# Patient Record
Sex: FEMALE | Race: WHITE | HISPANIC OR LATINO
[De-identification: names, ages, dates, MRNs, and addresses within clinical notes are randomized per-mention and may not be internally consistent; named-entity substitution may affect disease eponyms.]

---

## 2022-03-03 ENCOUNTER — NON-APPOINTMENT (OUTPATIENT)
Age: 72
End: 2022-03-03

## 2022-03-03 ENCOUNTER — LABORATORY RESULT (OUTPATIENT)
Age: 72
End: 2022-03-03

## 2022-03-03 ENCOUNTER — APPOINTMENT (OUTPATIENT)
Dept: INTERNAL MEDICINE | Facility: CLINIC | Age: 72
End: 2022-03-03
Payer: MEDICARE

## 2022-03-03 VITALS
SYSTOLIC BLOOD PRESSURE: 100 MMHG | HEIGHT: 61 IN | WEIGHT: 120 LBS | TEMPERATURE: 97.8 F | HEART RATE: 74 BPM | BODY MASS INDEX: 22.66 KG/M2 | OXYGEN SATURATION: 97 % | RESPIRATION RATE: 16 BRPM | DIASTOLIC BLOOD PRESSURE: 76 MMHG

## 2022-03-03 DIAGNOSIS — Z78.9 OTHER SPECIFIED HEALTH STATUS: ICD-10-CM

## 2022-03-03 DIAGNOSIS — Z80.3 FAMILY HISTORY OF MALIGNANT NEOPLASM OF BREAST: ICD-10-CM

## 2022-03-03 DIAGNOSIS — Z82.49 FAMILY HISTORY OF ISCHEMIC HEART DISEASE AND OTHER DISEASES OF THE CIRCULATORY SYSTEM: ICD-10-CM

## 2022-03-03 DIAGNOSIS — I89.0 LYMPHEDEMA, NOT ELSEWHERE CLASSIFIED: ICD-10-CM

## 2022-03-03 DIAGNOSIS — Z82.0 FAMILY HISTORY OF EPILEPSY AND OTHER DISEASES OF THE NERVOUS SYSTEM: ICD-10-CM

## 2022-03-03 PROCEDURE — 93000 ELECTROCARDIOGRAM COMPLETE: CPT

## 2022-03-03 PROCEDURE — 99387 INIT PM E/M NEW PAT 65+ YRS: CPT | Mod: 25

## 2022-03-03 NOTE — HEALTH RISK ASSESSMENT
[Excellent] : ~his/her~ current health as excellent [Good] : ~his/her~  mood as  good [Never] : Never [Yes] : Yes [de-identified] : social [de-identified] : Walks for exercise

## 2022-03-03 NOTE — HISTORY OF PRESENT ILLNESS
[FreeTextEntry1] : Here for CPE\par Overall feeling well\par Pt is fully vaccinated against COVID\par Pt had COVID infection in December 2021 [de-identified] : Never a smoker. Social alcohol.\par Last mammogram October 2021. Last GYN check-up over 1 year ago.\par Last colonoscopy about 10 years ago.\par Walks daily for exercise.

## 2022-03-05 ENCOUNTER — TRANSCRIPTION ENCOUNTER (OUTPATIENT)
Age: 72
End: 2022-03-05

## 2022-03-09 LAB
25(OH)D3 SERPL-MCNC: 31.9 NG/ML
ALBUMIN SERPL ELPH-MCNC: 4.3 G/DL
ALP BLD-CCNC: 73 U/L
ALT SERPL-CCNC: 14 U/L
ANION GAP SERPL CALC-SCNC: 11 MMOL/L
APPEARANCE: CLEAR
AST SERPL-CCNC: 17 U/L
BASOPHILS # BLD AUTO: 0.04 K/UL
BASOPHILS NFR BLD AUTO: 0.7 %
BILIRUB SERPL-MCNC: 0.8 MG/DL
BILIRUBIN URINE: NEGATIVE
BLOOD URINE: NEGATIVE
BUN SERPL-MCNC: 20 MG/DL
CALCIUM SERPL-MCNC: 9.4 MG/DL
CHLORIDE SERPL-SCNC: 106 MMOL/L
CHOLEST SERPL-MCNC: 167 MG/DL
CO2 SERPL-SCNC: 27 MMOL/L
COLOR: YELLOW
CREAT SERPL-MCNC: 0.86 MG/DL
EGFR: 73 ML/MIN/1.73M2
EOSINOPHIL # BLD AUTO: 0.2 K/UL
EOSINOPHIL NFR BLD AUTO: 3.5 %
ESTIMATED AVERAGE GLUCOSE: 100 MG/DL
FOLATE SERPL-MCNC: 16.5 NG/ML
GLUCOSE QUALITATIVE U: NEGATIVE
GLUCOSE SERPL-MCNC: 89 MG/DL
HBA1C MFR BLD HPLC: 5.1 %
HCT VFR BLD CALC: 41.9 %
HDLC SERPL-MCNC: 70 MG/DL
HGB BLD-MCNC: 13.6 G/DL
IMM GRANULOCYTES NFR BLD AUTO: 0.2 %
KETONES URINE: NEGATIVE
LDLC SERPL CALC-MCNC: 74 MG/DL
LEUKOCYTE ESTERASE URINE: ABNORMAL
LYMPHOCYTES # BLD AUTO: 1.67 K/UL
LYMPHOCYTES NFR BLD AUTO: 29.6 %
MAN DIFF?: NORMAL
MCHC RBC-ENTMCNC: 31.3 PG
MCHC RBC-ENTMCNC: 32.5 GM/DL
MCV RBC AUTO: 96.3 FL
MONOCYTES # BLD AUTO: 0.47 K/UL
MONOCYTES NFR BLD AUTO: 8.3 %
NEUTROPHILS # BLD AUTO: 3.26 K/UL
NEUTROPHILS NFR BLD AUTO: 57.7 %
NITRITE URINE: NEGATIVE
NONHDLC SERPL-MCNC: 97 MG/DL
PH URINE: 6.5
PLATELET # BLD AUTO: 177 K/UL
POTASSIUM SERPL-SCNC: 4.3 MMOL/L
PROT SERPL-MCNC: 6 G/DL
PROTEIN URINE: NORMAL
RBC # BLD: 4.35 M/UL
RBC # FLD: 12.9 %
SODIUM SERPL-SCNC: 143 MMOL/L
SPECIFIC GRAVITY URINE: 1.02
TRIGL SERPL-MCNC: 115 MG/DL
TSH SERPL-ACNC: 1.67 UIU/ML
UROBILINOGEN URINE: NORMAL
VIT B12 SERPL-MCNC: 419 PG/ML
WBC # FLD AUTO: 5.65 K/UL

## 2022-03-14 DIAGNOSIS — R39.9 UNSPECIFIED SYMPTOMS AND SIGNS INVOLVING THE GENITOURINARY SYSTEM: ICD-10-CM

## 2022-10-20 ENCOUNTER — APPOINTMENT (OUTPATIENT)
Dept: INTERNAL MEDICINE | Facility: CLINIC | Age: 72
End: 2022-10-20

## 2022-10-20 VITALS
RESPIRATION RATE: 16 BRPM | BODY MASS INDEX: 22.28 KG/M2 | TEMPERATURE: 97.6 F | OXYGEN SATURATION: 97 % | HEART RATE: 65 BPM | WEIGHT: 118 LBS | SYSTOLIC BLOOD PRESSURE: 126 MMHG | DIASTOLIC BLOOD PRESSURE: 72 MMHG | HEIGHT: 61 IN

## 2022-10-20 DIAGNOSIS — R49.0 DYSPHONIA: ICD-10-CM

## 2022-10-20 LAB — S PYO AG SPEC QL IA: NEGATIVE

## 2022-10-20 PROCEDURE — 87880 STREP A ASSAY W/OPTIC: CPT | Mod: QW

## 2022-10-20 PROCEDURE — 99214 OFFICE O/P EST MOD 30 MIN: CPT | Mod: 25

## 2022-10-20 NOTE — HISTORY OF PRESENT ILLNESS
[FreeTextEntry8] : Pt is c/o hoarseness for 2 days. No sore throat.\par Tested negative for covid. No fever.\par Vaxed and boosted against covid.

## 2023-05-18 ENCOUNTER — APPOINTMENT (OUTPATIENT)
Dept: INTERNAL MEDICINE | Facility: CLINIC | Age: 73
End: 2023-05-18
Payer: MEDICARE

## 2023-05-18 ENCOUNTER — NON-APPOINTMENT (OUTPATIENT)
Age: 73
End: 2023-05-18

## 2023-05-18 ENCOUNTER — LABORATORY RESULT (OUTPATIENT)
Age: 73
End: 2023-05-18

## 2023-05-18 VITALS
BODY MASS INDEX: 21.9 KG/M2 | HEIGHT: 61 IN | RESPIRATION RATE: 14 BRPM | WEIGHT: 116 LBS | DIASTOLIC BLOOD PRESSURE: 82 MMHG | TEMPERATURE: 98.5 F | HEART RATE: 72 BPM | OXYGEN SATURATION: 97 % | SYSTOLIC BLOOD PRESSURE: 122 MMHG

## 2023-05-18 DIAGNOSIS — E78.2 MIXED HYPERLIPIDEMIA: ICD-10-CM

## 2023-05-18 DIAGNOSIS — Z00.00 ENCOUNTER FOR GENERAL ADULT MEDICAL EXAMINATION W/OUT ABNORMAL FINDINGS: ICD-10-CM

## 2023-05-18 PROCEDURE — 93000 ELECTROCARDIOGRAM COMPLETE: CPT | Mod: 59

## 2023-05-18 PROCEDURE — G0439: CPT

## 2023-05-18 NOTE — PLAN
[FreeTextEntry1] : Check labs\par Cologuard test for colorectal screening.\par Yearly mammogram and GYN check-up

## 2023-05-18 NOTE — HEALTH RISK ASSESSMENT
[Very Good] : ~his/her~ current health as very good [Good] : ~his/her~  mood as  good [Yes] : Yes [de-identified] : occasional [de-identified] : Walks for exercise

## 2023-05-18 NOTE — HISTORY OF PRESENT ILLNESS
[FreeTextEntry1] : Here for CPE\par Overall feeling well\par Has a small hemorrhoid\par Vaxed and boosted against covid\par  [de-identified] : Never a smoker. Occasional alcohol.\par Last mammogram and GYN check-up within last 12 months.\par Last colonoscopy about 10 years ago.\par Walks daily for exercise.

## 2023-05-19 LAB
25(OH)D3 SERPL-MCNC: 38.4 NG/ML
ALBUMIN SERPL ELPH-MCNC: 4.6 G/DL
ALP BLD-CCNC: 69 U/L
ALT SERPL-CCNC: 13 U/L
ANION GAP SERPL CALC-SCNC: 12 MMOL/L
APPEARANCE: CLEAR
AST SERPL-CCNC: 21 U/L
BILIRUB SERPL-MCNC: 0.8 MG/DL
BILIRUBIN URINE: NEGATIVE
BLOOD URINE: ABNORMAL
BUN SERPL-MCNC: 21 MG/DL
CALCIUM SERPL-MCNC: 9.9 MG/DL
CHLORIDE SERPL-SCNC: 103 MMOL/L
CHOLEST SERPL-MCNC: 190 MG/DL
CO2 SERPL-SCNC: 27 MMOL/L
COLOR: YELLOW
CREAT SERPL-MCNC: 0.88 MG/DL
EGFR: 70 ML/MIN/1.73M2
ESTIMATED AVERAGE GLUCOSE: 105 MG/DL
FOLATE SERPL-MCNC: 19.6 NG/ML
GLUCOSE QUALITATIVE U: NEGATIVE MG/DL
GLUCOSE SERPL-MCNC: 95 MG/DL
HBA1C MFR BLD HPLC: 5.3 %
HDLC SERPL-MCNC: 80 MG/DL
KETONES URINE: NEGATIVE MG/DL
LDLC SERPL CALC-MCNC: 96 MG/DL
LEUKOCYTE ESTERASE URINE: ABNORMAL
NITRITE URINE: NEGATIVE
NONHDLC SERPL-MCNC: 110 MG/DL
PH URINE: 5.5
POTASSIUM SERPL-SCNC: 5.3 MMOL/L
PROT SERPL-MCNC: 6.7 G/DL
PROTEIN URINE: NEGATIVE MG/DL
SODIUM SERPL-SCNC: 142 MMOL/L
SPECIFIC GRAVITY URINE: 1.02
TRIGL SERPL-MCNC: 67 MG/DL
TSH SERPL-ACNC: 1.77 UIU/ML
UROBILINOGEN URINE: 0.2 MG/DL
VIT B12 SERPL-MCNC: 488 PG/ML

## 2023-05-22 ENCOUNTER — RX RENEWAL (OUTPATIENT)
Age: 73
End: 2023-05-22

## 2023-05-22 DIAGNOSIS — K64.9 UNSPECIFIED HEMORRHOIDS: ICD-10-CM

## 2023-05-22 RX ORDER — ATORVASTATIN CALCIUM 20 MG/1
20 TABLET, FILM COATED ORAL
Qty: 90 | Refills: 1 | Status: ACTIVE | COMMUNITY
Start: 2023-05-22 | End: 1900-01-01

## 2023-09-01 ENCOUNTER — APPOINTMENT (OUTPATIENT)
Dept: INTERNAL MEDICINE | Facility: CLINIC | Age: 73
End: 2023-09-01
Payer: MEDICARE

## 2023-09-01 VITALS
OXYGEN SATURATION: 98 % | DIASTOLIC BLOOD PRESSURE: 80 MMHG | HEART RATE: 70 BPM | BODY MASS INDEX: 21.71 KG/M2 | TEMPERATURE: 97.8 F | HEIGHT: 61 IN | RESPIRATION RATE: 14 BRPM | SYSTOLIC BLOOD PRESSURE: 120 MMHG | WEIGHT: 115 LBS

## 2023-09-01 LAB
CLARITY UR: NORMAL
GLUCOSE UR-MCNC: NORMAL
HCG UR QL: 0.2 EU/DL
HGB UR QL STRIP.AUTO: NORMAL
KETONES UR-MCNC: NORMAL
LEUKOCYTE ESTERASE UR QL STRIP: NORMAL
NITRITE UR QL STRIP: NORMAL
PH UR STRIP: 6.5
PROT UR STRIP-MCNC: NORMAL
SP GR UR STRIP: 1.02

## 2023-09-01 PROCEDURE — 99213 OFFICE O/P EST LOW 20 MIN: CPT | Mod: 25

## 2023-09-01 PROCEDURE — 81003 URINALYSIS AUTO W/O SCOPE: CPT | Mod: QW

## 2023-09-01 NOTE — HISTORY OF PRESENT ILLNESS
[FreeTextEntry8] : 73 year old female who presents today for a urine test.  She feels well and has no complaints.  She had a urine test done in May which showed small blood and leuk esterase but she was asymptomatic.  She had been advised to repeat the urine, so she has come in to do so.  She feels well, denies any dysuria, urgency, frequency, CVA tenderness, fevers or chills.

## 2023-09-01 NOTE — PHYSICAL EXAM
[Soft] : abdomen soft [Non Tender] : non-tender [Non-distended] : non-distended [Normal] : affect was normal and insight and judgment were intact

## 2023-09-01 NOTE — PLAN
[FreeTextEntry1] : Abnormal UA  - Patient with blood and leuk esterase on prior UA  - She had no symptoms and continues to have no UTI symptoms  - POCT urine dip with trace blood and leuk esterase  - Will hold off on treatment pending UA and culture.

## 2023-09-05 DIAGNOSIS — R82.90 UNSPECIFIED ABNORMAL FINDINGS IN URINE: ICD-10-CM

## 2023-09-05 LAB
APPEARANCE: CLEAR
BACTERIA UR CULT: NORMAL
BACTERIA: NEGATIVE /HPF
BILIRUBIN URINE: NEGATIVE
BLOOD URINE: NEGATIVE
CAST: 0 /LPF
COLOR: YELLOW
EPITHELIAL CELLS: 2 /HPF
GLUCOSE QUALITATIVE U: NEGATIVE MG/DL
KETONES URINE: NEGATIVE MG/DL
LEUKOCYTE ESTERASE URINE: ABNORMAL
MICROSCOPIC-UA: NORMAL
NITRITE URINE: NEGATIVE
PH URINE: 7
PROTEIN URINE: NEGATIVE MG/DL
RED BLOOD CELLS URINE: 5 /HPF
REVIEW: NORMAL
SPECIFIC GRAVITY URINE: 1.02
UROBILINOGEN URINE: 0.2 MG/DL
WHITE BLOOD CELLS URINE: 1 /HPF

## 2023-09-07 LAB
APPEARANCE: CLEAR
BACTERIA: NEGATIVE /HPF
BILIRUBIN URINE: NEGATIVE
BLOOD URINE: ABNORMAL
CAST: 0 /LPF
COLOR: YELLOW
EPITHELIAL CELLS: 3 /HPF
GLUCOSE QUALITATIVE U: NEGATIVE MG/DL
KETONES URINE: NEGATIVE MG/DL
LEUKOCYTE ESTERASE URINE: ABNORMAL
MICROSCOPIC-UA: NORMAL
NITRITE URINE: NEGATIVE
PH URINE: 6.5
PROTEIN URINE: NEGATIVE MG/DL
RED BLOOD CELLS URINE: 2 /HPF
REVIEW: NORMAL
SPECIFIC GRAVITY URINE: 1.01
UROBILINOGEN URINE: 0.2 MG/DL
WHITE BLOOD CELLS URINE: 2 /HPF

## 2023-10-11 ENCOUNTER — EMERGENCY (EMERGENCY)
Facility: HOSPITAL | Age: 73
LOS: 1 days | Discharge: HOME | End: 2023-10-11
Attending: EMERGENCY MEDICINE | Admitting: EMERGENCY MEDICINE
Payer: MEDICARE

## 2023-10-11 VITALS
HEIGHT: 61 IN | WEIGHT: 115.08 LBS | RESPIRATION RATE: 17 BRPM | TEMPERATURE: 98 F | OXYGEN SATURATION: 100 % | SYSTOLIC BLOOD PRESSURE: 123 MMHG | HEART RATE: 66 BPM | DIASTOLIC BLOOD PRESSURE: 74 MMHG

## 2023-10-11 VITALS
TEMPERATURE: 99 F | OXYGEN SATURATION: 98 % | RESPIRATION RATE: 16 BRPM | DIASTOLIC BLOOD PRESSURE: 63 MMHG | HEART RATE: 79 BPM | SYSTOLIC BLOOD PRESSURE: 100 MMHG

## 2023-10-11 DIAGNOSIS — Z01.818 ENCOUNTER FOR OTHER PREPROCEDURAL EXAMINATION: Chronic | ICD-10-CM

## 2023-10-11 PROCEDURE — 73590 X-RAY EXAM OF LOWER LEG: CPT | Mod: 26,RT

## 2023-10-11 PROCEDURE — 73610 X-RAY EXAM OF ANKLE: CPT

## 2023-10-11 PROCEDURE — 99284 EMERGENCY DEPT VISIT MOD MDM: CPT | Mod: FS

## 2023-10-11 PROCEDURE — 73610 X-RAY EXAM OF ANKLE: CPT | Mod: 26,RT

## 2023-10-11 PROCEDURE — 73562 X-RAY EXAM OF KNEE 3: CPT

## 2023-10-11 PROCEDURE — 73562 X-RAY EXAM OF KNEE 3: CPT | Mod: 26,RT

## 2023-10-11 PROCEDURE — 73590 X-RAY EXAM OF LOWER LEG: CPT

## 2023-10-11 PROCEDURE — 73130 X-RAY EXAM OF HAND: CPT

## 2023-10-11 PROCEDURE — 99284 EMERGENCY DEPT VISIT MOD MDM: CPT

## 2023-10-11 PROCEDURE — 73130 X-RAY EXAM OF HAND: CPT | Mod: 26,LT

## 2023-10-11 RX ORDER — IBUPROFEN 200 MG
600 TABLET ORAL ONCE
Refills: 0 | Status: COMPLETED | OUTPATIENT
Start: 2023-10-11 | End: 2023-10-11

## 2023-10-11 RX ADMIN — Medication 600 MILLIGRAM(S): at 14:53

## 2023-10-11 RX ADMIN — Medication 600 MILLIGRAM(S): at 14:11

## 2023-10-11 NOTE — ED PROVIDER NOTE - MUSCULOSKELETAL MINIMAL EXAM
+ TTP right anterior knee with decreased ROM due to pain. + swelling to right knee joint. + mild TTP right lateral ankle with FROM of ankle. dp pulses equal and intact bilaterally.

## 2023-10-11 NOTE — ED ADULT TRIAGE NOTE - CHIEF COMPLAINT QUOTE
72 y/o female presents aox4 via wheelchair c/o left knee pain after falling from her stairs at home. denies head trauma/loc.

## 2023-10-11 NOTE — ED PROVIDER NOTE - CARE PROVIDER_API CALL
John Butler.  Orthopaedic Surgery  [address]  [place] [address]  Phone: [phone]  Fax: [phone]  Follow Up Time: 1-3 Days

## 2023-10-11 NOTE — ED PROVIDER NOTE - CLINICAL SUMMARY MEDICAL DECISION MAKING FREE TEXT BOX
73-year-old female with no significant past medical history fell at home complaining of right knee pain.  Denies head injury neck or back pain or other injury or symptom.    Physical exam vital signs stable afebrile no distress.  Head is atraumatic nontender scalp.  Neck is supple full range of motion intact nontender.  Chest and abdomen atraumatic.  Extremity exam reveals swelling and tenderness right anterior knee.  No bony deformity.  No joint instability.  Full range of motion pulses and sensation intact.  Remainder of exam unremarkable.  There is no spinal tenderness or deformity.  Neuro intact no deficits.  Impression is status post fall right knee injury rule out fracture.  Plan is x-ray ice pack knee immobilizer Ortho follow-up.

## 2023-10-11 NOTE — ED PROVIDER NOTE - PATIENT PORTAL LINK FT
You can access the FollowMyHealth Patient Portal offered by [facility] by registering at the following website: [email or web address]. By joining [facility]’s FollowMyHealth portal, you will also be able to view your health information using other applications (apps) compatible with our system.

## 2023-10-11 NOTE — ED PROVIDER NOTE - NSFOLLOWUPINSTRUCTIONS_ED_ALL_ED_FT

## 2023-10-11 NOTE — ED ADULT NURSE NOTE - OBJECTIVE STATEMENT
C/o right knee and left hand pain s/p fall. Pt states, "I slipped and fell down the stairs hitting my right knee and hand. +Swelling to right knee and limited ROM. Pt unable to bear weight on leg. Hx of HLD. Pt alert and oriented x3, respirations even and unlabored, skin warm and dry, color appropriate for ethnicity, speech clear, moving extremities. Updated pt on plan of care.

## 2023-10-11 NOTE — ED PROVIDER NOTE - OBJECTIVE STATEMENT
73-year-old female with history of hyperlipidemia presents to the ED complaining of right knee pain and injury which occurred at 9:30 AM this morning.  Patient explains she was walking down the steps, missed the step and fell forward.  Patient states she landed on her right leg and thinks she twisted her knee.  No fall directly onto the knee.  No head trauma or LOC. Patient was initially able to ambulate on her knee however as the day went on her knee became more swollen and painful and now with difficulty ambulating.  Patient did not take any medication for pain.  No previous knee injury or surgery.  Denies fever, chills, chest pain, shortness of breath, abdominal pain, nausea, vomiting, lower extremity weakness or paresthesias. no blood thinners.

## 2023-10-13 ENCOUNTER — APPOINTMENT (OUTPATIENT)
Dept: ORTHOPEDIC SURGERY | Facility: CLINIC | Age: 73
End: 2023-10-13
Payer: MEDICARE

## 2023-10-13 ENCOUNTER — APPOINTMENT (OUTPATIENT)
Dept: MRI IMAGING | Facility: CLINIC | Age: 73
End: 2023-10-13
Payer: MEDICARE

## 2023-10-13 VITALS — HEIGHT: 60 IN | WEIGHT: 115 LBS | BODY MASS INDEX: 22.58 KG/M2

## 2023-10-13 DIAGNOSIS — S80.01XA CONTUSION OF RIGHT KNEE, INITIAL ENCOUNTER: ICD-10-CM

## 2023-10-13 DIAGNOSIS — S60.222A CONTUSION OF LEFT HAND, INITIAL ENCOUNTER: ICD-10-CM

## 2023-10-13 DIAGNOSIS — S63.659A SPRAIN OF METACARPOPHALANGEAL JOINT OF UNSPECIFIED FINGER, INITIAL ENCOUNTER: ICD-10-CM

## 2023-10-13 DIAGNOSIS — E78.00 PURE HYPERCHOLESTEROLEMIA, UNSPECIFIED: ICD-10-CM

## 2023-10-13 DIAGNOSIS — M23.91 UNSPECIFIED INTERNAL DERANGEMENT OF RIGHT KNEE: ICD-10-CM

## 2023-10-13 PROCEDURE — 73218 MRI UPPER EXTREMITY W/O DYE: CPT | Mod: LT,MH

## 2023-10-13 PROCEDURE — 99214 OFFICE O/P EST MOD 30 MIN: CPT

## 2023-10-19 ENCOUNTER — APPOINTMENT (OUTPATIENT)
Dept: ORTHOPEDIC SURGERY | Facility: CLINIC | Age: 73
End: 2023-10-19
Payer: MEDICARE

## 2023-10-19 PROCEDURE — 99214 OFFICE O/P EST MOD 30 MIN: CPT

## 2023-11-02 ENCOUNTER — APPOINTMENT (OUTPATIENT)
Dept: ORTHOPEDIC SURGERY | Facility: CLINIC | Age: 73
End: 2023-11-02
Payer: MEDICARE

## 2023-11-02 PROCEDURE — 99213 OFFICE O/P EST LOW 20 MIN: CPT

## 2023-11-07 ENCOUNTER — NON-APPOINTMENT (OUTPATIENT)
Age: 73
End: 2023-11-07

## 2023-11-15 ENCOUNTER — APPOINTMENT (OUTPATIENT)
Dept: ORTHOPEDIC SURGERY | Facility: CLINIC | Age: 73
End: 2023-11-15
Payer: MEDICARE

## 2023-11-15 ENCOUNTER — NON-APPOINTMENT (OUTPATIENT)
Age: 73
End: 2023-11-15

## 2023-11-15 VITALS
HEIGHT: 61 IN | HEART RATE: 76 BPM | SYSTOLIC BLOOD PRESSURE: 122 MMHG | DIASTOLIC BLOOD PRESSURE: 70 MMHG | BODY MASS INDEX: 21.9 KG/M2 | WEIGHT: 116 LBS

## 2023-11-15 PROCEDURE — 99213 OFFICE O/P EST LOW 20 MIN: CPT

## 2023-11-15 PROCEDURE — 73130 X-RAY EXAM OF HAND: CPT | Mod: LT

## 2023-11-16 ENCOUNTER — APPOINTMENT (OUTPATIENT)
Dept: ORTHOPEDIC SURGERY | Facility: CLINIC | Age: 73
End: 2023-11-16
Payer: MEDICARE

## 2023-11-16 DIAGNOSIS — S63.653A SPRAIN OF METACARPOPHALANGEAL JOINT OF LEFT MIDDLE FINGER, INITIAL ENCOUNTER: ICD-10-CM

## 2023-11-16 PROCEDURE — 99213 OFFICE O/P EST LOW 20 MIN: CPT

## 2023-11-20 ENCOUNTER — APPOINTMENT (OUTPATIENT)
Dept: ORTHOPEDIC SURGERY | Facility: CLINIC | Age: 73
End: 2023-11-20
Payer: MEDICARE

## 2023-11-20 PROCEDURE — 99214 OFFICE O/P EST MOD 30 MIN: CPT

## 2023-11-30 ENCOUNTER — APPOINTMENT (OUTPATIENT)
Dept: ORTHOPEDIC SURGERY | Facility: CLINIC | Age: 73
End: 2023-11-30

## 2023-12-01 ENCOUNTER — NON-APPOINTMENT (OUTPATIENT)
Age: 73
End: 2023-12-01

## 2023-12-11 ENCOUNTER — APPOINTMENT (OUTPATIENT)
Dept: ORTHOPEDIC SURGERY | Facility: CLINIC | Age: 73
End: 2023-12-11
Payer: MEDICARE

## 2023-12-11 PROCEDURE — 99214 OFFICE O/P EST MOD 30 MIN: CPT

## 2024-01-03 ENCOUNTER — NON-APPOINTMENT (OUTPATIENT)
Age: 74
End: 2024-01-03

## 2024-01-15 ENCOUNTER — APPOINTMENT (OUTPATIENT)
Dept: ORTHOPEDIC SURGERY | Facility: CLINIC | Age: 74
End: 2024-01-15
Payer: MEDICARE

## 2024-01-15 PROCEDURE — 99214 OFFICE O/P EST MOD 30 MIN: CPT

## 2024-01-15 NOTE — DISCUSSION/SUMMARY
[FreeTextEntry1] : I had a discussion regarding today's visit, the diagnosis and treatment recommendations and options.  We also discussed changes since the last visit.  I did tell her that it is indeterminate whether or not her pain will continue to improve, as I do believe that her persistent pain is secondary to the injury to her middle finger MCP joint radial collateral ligament.  However, she does have persistent swelling and stiffness secondary to her arthritis.  She is somewhat improved in this regard with therapy.  At this time, I prescribed her a Medrol Dosepak for which she has not decided yet whether she wants to take. She will continue OT. I recommended that she return in a month to reevaluate.  Finally, I did tell her that that is indeterminate to what extent she will further improve and if she does not further improve, then she should possibly consider surgical intervention.  The patient has agreed to the above plan of management and has expressed full understanding.  All questions were fully answered to the patient's satisfaction.  My cumulative time spent on today's visit was greater than 30 minutes and included: Preparation for the visit, review of the medical records, review of pertinent diagnostic studies, examination and counseling of the patient on the above diagnosis, treatment plan and prognosis, orders of diagnostic tests, medications and/or appropriate procedures and documentation in the medical records of today's visit.

## 2024-01-15 NOTE — END OF VISIT
[FreeTextEntry3] : This note was written by Eber Bae on 01/15/2024 acting solely as a scribe for Dr. Alfredito Dewey.   All medical record entries made by the Scribe were at my, Dr. Alfredito Dewey, direction and personally dictated by me on 01/15/2024. I have personally reviewed the chart and agree that the record accurately reflects my personal performance of the history, physical exam, assessment and plan.

## 2024-01-15 NOTE — HISTORY OF PRESENT ILLNESS
[FreeTextEntry1] : Approximately 3-1/2 months status post left-hand middle finger MCP joint radial collateral ligament injury.  See prior notes. She was last seen in the office 5 weeks ago and was referred to hand therapy.  She is here today with residual pain and stiffness in her left middle knuckle. She has been going to OT three times a week. She's been having difficulty with everyday tasks like opening jars or cutting vegetables.  Of note, she is very active and is a bowler.  She is compliant with atorvastatin.  She is accompanied by her  today.

## 2024-01-15 NOTE — PHYSICAL EXAM
[de-identified] : - Constitutional: This is a female in no obvious distress. She is accompanied by her  today.  - Psych: Patient is alert and oriented to person, place and time.  Patient has a normal mood and affect. - Cardiovascular: Normal pulses throughout the upper extremities.  No significant varicosities are noted in the upper extremities. - Neuro: Strength and sensation are intact throughout the upper extremities.  Patient has normal coordination. - Respiratory:  Patient exhibits no evidence of shortness of breath or difficulty breathing. - Skin: No rashes, lesions, or other abnormalities are noted in the upper extremities. ---  Examination of her left hand demonstrates residual although decreased swelling at the MCP joints, most notably at the middle finger.  She has residual although decreased tenderness.  The middle finger no longer appears ulnar deviated.  She has limitation of terminal flexion and extension of the digits which is somewhat improved.  However, she has persistent pain to stress testing of the middle finger MCP joint radial collateral ligament.  There is possibly mild laxity.  She remains neurovascularly intact distally. [de-identified] : An MRI of her left hand dated 10/13/2023 demonstrated soft tissue swelling, tenosynovitis and muscle strains throughout the hand particularly surrounding the third digit and dorsally with bursitis in the second webspace. There is no acute fracture, malalignment, ligament discontinuity or tendon discontinuity.  -According to my reading of the MRI, she does appear to have a left middle finger MCP joint radial collateral ligament injury off of the origin of the third metacarpal.  It has not retracted distally.  AP, lateral, and oblique radiographs of the left hand dated 11/15/2023 demonstrated no obvious fractures or dislocations.  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

## 2024-01-31 ENCOUNTER — NON-APPOINTMENT (OUTPATIENT)
Age: 74
End: 2024-01-31

## 2024-02-12 ENCOUNTER — APPOINTMENT (OUTPATIENT)
Dept: ORTHOPEDIC SURGERY | Facility: CLINIC | Age: 74
End: 2024-02-12
Payer: MEDICARE

## 2024-02-12 PROCEDURE — 99214 OFFICE O/P EST MOD 30 MIN: CPT

## 2024-02-12 NOTE — PHYSICAL EXAM
[de-identified] : - Constitutional: This is a female in no obvious distress. She is accompanied by her  today.  - Psych: Patient is alert and oriented to person, place and time.  Patient has a normal mood and affect. - Cardiovascular: Normal pulses throughout the upper extremities.  No significant varicosities are noted in the upper extremities. - Neuro: Strength and sensation are intact throughout the upper extremities.  Patient has normal coordination. - Respiratory:  Patient exhibits no evidence of shortness of breath or difficulty breathing. - Skin: No rashes, lesions, or other abnormalities are noted in the upper extremities. ---  Examination of her left hand demonstrates decreased swelling at the MCP joints, most notably at the middle finger.  She has residual although decreased tenderness.  She has limitation of terminal flexion and extension of the digits which is improved.  She still has difficulty making a full composite fist.  She has persistent pain to stress testing of the middle finger MCP joint radial collateral ligament.  There is possibly mild laxity.  She remains neurovascularly intact distally. [de-identified] : An MRI of her left hand dated 10/13/2023 demonstrated soft tissue swelling, tenosynovitis and muscle strains throughout the hand particularly surrounding the third digit and dorsally with bursitis in the second webspace. There is no acute fracture, malalignment, ligament discontinuity or tendon discontinuity.  -According to my reading of the MRI, she does appear to have a left middle finger MCP joint radial collateral ligament injury off of the origin of the third metacarpal.  It has not retracted distally.  AP, lateral, and oblique radiographs of the left hand dated 11/15/2023 demonstrated no obvious fractures or dislocations.  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

## 2024-02-12 NOTE — DISCUSSION/SUMMARY
[FreeTextEntry1] : I had a discussion regarding today's visit, the diagnosis and treatment recommendations and options.  We also discussed changes since the last visit.  At this time, I renewed her hand therapy prescription. She will follow up in 6 weeks to discuss other treatment options, including another Medrol Dosepak, which provided her significant relief after her last visit.  Again, we discussed her ultimate prognosis.  I did tell her that although she does have residual symptoms secondary to the middle finger MCP joint radial collateral ligament injury, she has residual swelling and stiffness throughout the hand, with underlying arthritis which has become symptomatic since the injury.  In terms of surgery, she would like to avoid this.  In addition, my main concern is that with surgery, she will develop more swelling and stiffness, which could potentially exacerbate the stiffness throughout the hand.  We agreed to hold off on surgery and to reevaluate her in 6 weeks.  Again, she does understand that if the pain and laxity of the middle finger continues to be a long-term problem in the future, then this can be addressed surgically.  The patient has agreed to the above plan of management and has expressed full understanding.  All questions were fully answered to the patient's satisfaction.  My cumulative time spent on today's visit was greater than 30 minutes and included: Preparation for the visit, review of the medical records, review of pertinent diagnostic studies, examination and counseling of the patient on the above diagnosis, treatment plan and prognosis, orders of diagnostic tests, medications and/or appropriate procedures and documentation in the medical records of today's visit.

## 2024-02-12 NOTE — END OF VISIT
[FreeTextEntry3] : This note was written by Eber Bae on 02/12/2024 acting solely as a scribe for Dr. Alfredito Dewey.   All medical record entries made by the Scribe were at my, Dr. Alfredito Dewey, direction and personally dictated by me on 02/12/2024. I have personally reviewed the chart and agree that the record accurately reflects my personal performance of the history, physical exam, assessment and plan.

## 2024-02-12 NOTE — HISTORY OF PRESENT ILLNESS
[FreeTextEntry1] : Approximately 4-1/2 months status post left-hand middle finger MCP joint radial collateral ligament injury.  See prior notes.  She was last seen in the office 4 weeks ago and she was prescribed a Medrol Dosepak.  She is also in hand therapy  She is doing well. She still has some pain, numbness, and tingling, but she's been compliant with hand therapy and she feels that her condition has improved slightly. She's been soaking her hand in an epsom salt bath to relieve symptoms, and she reports that the Medrol Dosepak she was prescribed following her last visit greatly improved her symptoms.  She is accompanied by her  today.

## 2024-03-15 ENCOUNTER — NON-APPOINTMENT (OUTPATIENT)
Age: 74
End: 2024-03-15

## 2024-03-18 NOTE — PHYSICAL EXAM
[de-identified] : An MRI of her left hand dated 10/13/2023 demonstrated soft tissue swelling, tenosynovitis and muscle strains throughout the hand particularly surrounding the third digit and dorsally with bursitis in the second webspace. There is no acute fracture, malalignment, ligament discontinuity or tendon discontinuity.  -According to my reading of the MRI, she does appear to have a left middle finger MCP joint radial collateral ligament injury off of the origin of the third metacarpal.  It has not retracted distally.  AP, lateral, and oblique radiographs of the left hand dated 11/15/2023 demonstrated no obvious fractures or dislocations.  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   [de-identified] : - Constitutional: This is a female in no obvious distress. She is accompanied by her  today.  - Psych: Patient is alert and oriented to person, place and time.  Patient has a normal mood and affect. - Cardiovascular: Normal pulses throughout the upper extremities.  No significant varicosities are noted in the upper extremities. - Neuro: Strength and sensation are intact throughout the upper extremities.  Patient has normal coordination. - Respiratory:  Patient exhibits no evidence of shortness of breath or difficulty breathing. - Skin: No rashes, lesions, or other abnormalities are noted in the upper extremities. ---  Examination of her left hand demonstrates decreased swelling at the MCP joints, most notably at the middle finger.  She has residual although decreased tenderness.  She has limitation of terminal flexion and extension of the digits which is improved.  She still has difficulty making a full composite fist.  She has persistent pain to stress testing of the middle finger MCP joint radial collateral ligament.  There is possibly mild laxity.  She remains neurovascularly intact distally.

## 2024-03-18 NOTE — HISTORY OF PRESENT ILLNESS
[FreeTextEntry1] : Approximately 6 months status post left-hand middle finger MCP joint radial collateral ligament injury.  See prior notes.  She was last seen in the office 6 weeks ago.  She was prescribed a second Medrol Dosepak and I updated her therapy prescription.  She is  She is accompanied by her  today.

## 2024-03-25 ENCOUNTER — APPOINTMENT (OUTPATIENT)
Dept: ORTHOPEDIC SURGERY | Facility: CLINIC | Age: 74
End: 2024-03-25

## 2024-03-26 ENCOUNTER — APPOINTMENT (OUTPATIENT)
Dept: ORTHOPEDIC SURGERY | Facility: CLINIC | Age: 74
End: 2024-03-26
Payer: MEDICARE

## 2024-03-26 PROCEDURE — 99214 OFFICE O/P EST MOD 30 MIN: CPT

## 2024-03-26 NOTE — END OF VISIT
[FreeTextEntry3] : This note was written by Eber Bae on 3/26/24 acting solely as a scribe for Dr. Alfredito Dewey.   All medical record entries made by the Scribe were at my, Dr. Alfredito Dewey, direction and personally dictated by me on 3/26/24. I have personally reviewed the chart and agree that the record accurately reflects my personal performance of the history, physical exam, assessment and plan.

## 2024-03-26 NOTE — PHYSICAL EXAM
[de-identified] : - Constitutional: This is a female in no obvious distress. She is accompanied by her  today.  - Psych: Patient is alert and oriented to person, place and time.  Patient has a normal mood and affect. - Cardiovascular: Normal pulses throughout the upper extremities.  No significant varicosities are noted in the upper extremities. - Neuro: Strength and sensation are intact throughout the upper extremities.  Patient has normal coordination. - Respiratory:  Patient exhibits no evidence of shortness of breath or difficulty breathing. - Skin: No rashes, lesions, or other abnormalities are noted in the upper extremities. ---  Examination of her left hand demonstrates swelling at the MCP joints, most notably at the middle finger.  She has residual tenderness.  She has limitation of terminal flexion and extension of the digits.  She still has difficulty making a full composite fist.  She has persistent pain to stress testing of the middle finger MCP joint radial collateral ligament.  The middle finger is more ulnar deviated than her last visit.  She has instability to stress testing of the middle finger MCP joint radial collateral ligament.  She remains neurovascularly intact distally. [de-identified] : An MRI of her left hand dated 10/13/2023 demonstrated soft tissue swelling, tenosynovitis and muscle strains throughout the hand particularly surrounding the third digit and dorsally with bursitis in the second webspace. There is no acute fracture, malalignment, ligament discontinuity or tendon discontinuity.  -According to my reading of the MRI, she does appear to have a left middle finger MCP joint radial collateral ligament injury off of the origin of the third metacarpal.  It has not retracted distally.  AP, lateral, and oblique radiographs of the left hand dated 11/15/2023 demonstrated no obvious fractures or dislocations.  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

## 2024-03-26 NOTE — HISTORY OF PRESENT ILLNESS
[FreeTextEntry1] : Approximately 6 months status post left-hand middle finger MCP joint radial collateral ligament injury.  See prior notes.  She has been in hand therapy and has been prescribed a Medrol Dosepak.  She is still having pain. She stopped her therapy two weeks ago. She is interested in obtaining another MRI.  She is accompanied by her  today.

## 2024-03-26 NOTE — DISCUSSION/SUMMARY
[FreeTextEntry1] : I had a discussion regarding today's visit, the diagnosis and treatment recommendations and options.  We also discussed changes since the last visit.  At this time, I recommended that she obtain a repeat MRI. She will follow up after the MRI to discuss results and treatment options, which will likely entail surgery.  I do believe that despite her underlying arthritis and stiffness, she has significant persistent symptoms secondary to her middle finger MCP joint radial collateral ligament injury and she now has more notable instability and ulnar deviation.  I do believe that she is likely should consider undergoing surgical reconstruction of the ligament.  The patient has agreed to the above plan of management and has expressed full understanding.  All questions were fully answered to the patient's satisfaction.  My cumulative time spent on today's visit was greater than 30 minutes and included: Preparation for the visit, review of the medical records, review of pertinent diagnostic studies, examination and counseling of the patient on the above diagnosis, treatment plan and prognosis, orders of diagnostic tests, medications and/or appropriate procedures and documentation in the medical records of today's visit.

## 2024-03-28 ENCOUNTER — APPOINTMENT (OUTPATIENT)
Dept: MRI IMAGING | Facility: CLINIC | Age: 74
End: 2024-03-28
Payer: MEDICARE

## 2024-03-28 PROCEDURE — 73218 MRI UPPER EXTREMITY W/O DYE: CPT | Mod: LT

## 2024-04-18 ENCOUNTER — NON-APPOINTMENT (OUTPATIENT)
Age: 74
End: 2024-04-18

## 2024-04-29 ENCOUNTER — APPOINTMENT (OUTPATIENT)
Dept: ORTHOPEDIC SURGERY | Facility: CLINIC | Age: 74
End: 2024-04-29
Payer: MEDICARE

## 2024-04-29 VITALS — BODY MASS INDEX: 22.78 KG/M2 | WEIGHT: 116 LBS | HEIGHT: 60 IN

## 2024-04-29 PROCEDURE — 99214 OFFICE O/P EST MOD 30 MIN: CPT

## 2024-04-29 NOTE — PHYSICAL EXAM
[de-identified] : - Constitutional: This is a female in no obvious distress.  - Psych: Patient is alert and oriented to person, place and time.  Patient has a normal mood and affect. - Cardiovascular: Normal pulses throughout the upper extremities.  No significant varicosities are noted in the upper extremities. - Neuro: Strength and sensation are intact throughout the upper extremities.  Patient has normal coordination. - Respiratory:  Patient exhibits no evidence of shortness of breath or difficulty breathing. - Skin: No rashes, lesions, or other abnormalities are noted in the upper extremities. ---  Examination of her left hand demonstrates residual although decreased swelling at the MCP joints, most notably at the middle finger.  She has residual tenderness.  She has limitation of terminal flexion and extension of the digits, which is mostly secondary to her arthritis.  She still has difficulty making a full composite fist.  She has persistent pain to stress testing of the middle finger MCP joint radial collateral ligament.  The middle finger is more ulnar deviated than her last visit.  She has instability to stress testing of the middle finger MCP joint radial collateral ligament.  She remains neurovascularly intact distally. [de-identified] : A repeat MRI of her left hand dated 3/28/2024 demonstrated: Mildly edematous and thickened dorsal capsule of the middle finger MCP joint with a joint effusion and scar remodeling of the radial collateral ligament from a previous sprain.  An MRI of her left hand dated 10/13/2023 demonstrated soft tissue swelling, tenosynovitis and muscle strains throughout the hand particularly surrounding the third digit and dorsally with bursitis in the second webspace. There is no acute fracture, malalignment, ligament discontinuity or tendon discontinuity.  -According to my reading of the MRI, she does appear to have a left middle finger MCP joint radial collateral ligament injury off of the origin of the third metacarpal.  It has not retracted distally.  AP, lateral, and oblique radiographs of the left hand dated 11/15/2023 demonstrated no obvious fractures or dislocations.  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

## 2024-04-29 NOTE — END OF VISIT
[FreeTextEntry3] : This note was written by Shady Robbins on 04/29/2024 acting solely as a scribe for Dr. Alfredito Dewey.   All medical record entries made by the Scribe were at my, Dr. Alfredito Dewey, direction and personally dictated by me on 04/29/2024. I have personally reviewed the chart and agree that the record accurately reflects my personal performance of the history, physical exam, assessment and plan.

## 2024-04-29 NOTE — ADDENDUM
[FreeTextEntry1] :  I, Shady Robbins, acted solely as a scribe for Dr. Dewey on this date on 04/29/2024.

## 2024-04-29 NOTE — DISCUSSION/SUMMARY
[FreeTextEntry1] : I reviewed the MRI results with her.  I had a discussion regarding today's visit, the diagnosis and treatment recommendations and options.  We also discussed changes since the last visit.  At this time, I discussed the treatment options of observation or surgical management.  I did tell her that I do not believe that her symptoms will likely improve further with nonoperative management so her decision is to either leave it as it is or see with surgery.  We discussed surgical management.  With regard to surgery, I would recommend repair, possible reconstruction of her left middle finger MCP joint radial collateral ligament rupture.  She is leaning toward surgery but she would like to discuss this with her .  She will call the office if she decides to proceed with surgical management.   -  The nature and purposes of the operation/procedure was discussed in detail.  I discussed the surgical procedure in detail, as well as expected postoperative recovery and outcome. -  Possible risks, benefits, and complications (from known and unknown causes) of the procedure were discussed in detail.  -  Possible non-operative alternatives to the proposed treatment were discussed in detail.  -  She was told that possible risks/complications include, but are not limited to:  Infection, nerve or vessel injury, stiffness, painful scar, poor outcome, need for additional surgical procedures, and other unforeseen complications.  -  In addition, the possibility of an "unsuccessful outcome," despite "successful surgery," was discussed with her.  Specifically, we discussed persistent pain and stiffness.  She does understand that she does have underlying arthritis and this may get exacerbated after the surgery. -  The patient fully understands these risks.  Again, she would like to further discuss his with her  but she is leaning towards proceeding with surgery. -  I had a lengthy discussion with the patient regarding today's visit, the diagnosis, and my surgical treatment recommendations.  The patient has agreed to this plan of management and has expressed full understanding.  All questions were fully answered to the patient's satisfaction.  My cumulative time spent on today's visit was greater than 45 minutes and included: Preparation for the visit, review of the medical records, review of pertinent diagnostic studies, examination and counseling of the patient on the above diagnosis, treatment plan and prognosis, orders of diagnostic tests, medications and/or appropriate procedures and documentation in the medical records of today's visit.

## 2024-05-01 ENCOUNTER — NON-APPOINTMENT (OUTPATIENT)
Age: 74
End: 2024-05-01

## 2024-05-06 ENCOUNTER — NON-APPOINTMENT (OUTPATIENT)
Age: 74
End: 2024-05-06

## 2024-05-16 ENCOUNTER — OUTPATIENT (OUTPATIENT)
Dept: OUTPATIENT SERVICES | Facility: HOSPITAL | Age: 74
LOS: 1 days | End: 2024-05-16
Payer: MEDICARE

## 2024-05-16 VITALS
HEIGHT: 60 IN | TEMPERATURE: 98 F | WEIGHT: 115.96 LBS | HEART RATE: 64 BPM | SYSTOLIC BLOOD PRESSURE: 106 MMHG | DIASTOLIC BLOOD PRESSURE: 60 MMHG | RESPIRATION RATE: 14 BRPM | OXYGEN SATURATION: 96 %

## 2024-05-16 DIAGNOSIS — S63.653A SPRAIN OF METACARPOPHALANGEAL JOINT OF LEFT MIDDLE FINGER, INITIAL ENCOUNTER: ICD-10-CM

## 2024-05-16 DIAGNOSIS — Z98.51 TUBAL LIGATION STATUS: Chronic | ICD-10-CM

## 2024-05-16 DIAGNOSIS — Z01.818 ENCOUNTER FOR OTHER PREPROCEDURAL EXAMINATION: ICD-10-CM

## 2024-05-16 DIAGNOSIS — S69.92XA UNSPECIFIED INJURY OF LEFT WRIST, HAND AND FINGER(S), INITIAL ENCOUNTER: ICD-10-CM

## 2024-05-16 DIAGNOSIS — Z01.818 ENCOUNTER FOR OTHER PREPROCEDURAL EXAMINATION: Chronic | ICD-10-CM

## 2024-05-16 LAB
ALBUMIN SERPL ELPH-MCNC: 3.5 G/DL — SIGNIFICANT CHANGE UP (ref 3.3–5)
ALP SERPL-CCNC: 57 U/L — SIGNIFICANT CHANGE UP (ref 30–120)
ALT FLD-CCNC: 22 U/L — SIGNIFICANT CHANGE UP (ref 10–60)
ANION GAP SERPL CALC-SCNC: 8 MMOL/L — SIGNIFICANT CHANGE UP (ref 5–17)
AST SERPL-CCNC: 21 U/L — SIGNIFICANT CHANGE UP (ref 10–40)
BILIRUB SERPL-MCNC: 0.9 MG/DL — SIGNIFICANT CHANGE UP (ref 0.2–1.2)
BUN SERPL-MCNC: 18 MG/DL — SIGNIFICANT CHANGE UP (ref 7–23)
CALCIUM SERPL-MCNC: 8.7 MG/DL — SIGNIFICANT CHANGE UP (ref 8.4–10.5)
CHLORIDE SERPL-SCNC: 108 MMOL/L — SIGNIFICANT CHANGE UP (ref 96–108)
CO2 SERPL-SCNC: 27 MMOL/L — SIGNIFICANT CHANGE UP (ref 22–31)
CREAT SERPL-MCNC: 0.81 MG/DL — SIGNIFICANT CHANGE UP (ref 0.5–1.3)
EGFR: 77 ML/MIN/1.73M2 — SIGNIFICANT CHANGE UP
GLUCOSE SERPL-MCNC: 89 MG/DL — SIGNIFICANT CHANGE UP (ref 70–99)
HCT VFR BLD CALC: 40.5 % — SIGNIFICANT CHANGE UP (ref 34.5–45)
HGB BLD-MCNC: 13.7 G/DL — SIGNIFICANT CHANGE UP (ref 11.5–15.5)
MCHC RBC-ENTMCNC: 31.5 PG — SIGNIFICANT CHANGE UP (ref 27–34)
MCHC RBC-ENTMCNC: 33.8 GM/DL — SIGNIFICANT CHANGE UP (ref 32–36)
MCV RBC AUTO: 93.1 FL — SIGNIFICANT CHANGE UP (ref 80–100)
NRBC # BLD: 0 /100 WBCS — SIGNIFICANT CHANGE UP (ref 0–0)
PLATELET # BLD AUTO: 175 K/UL — SIGNIFICANT CHANGE UP (ref 150–400)
POTASSIUM SERPL-MCNC: 4.2 MMOL/L — SIGNIFICANT CHANGE UP (ref 3.5–5.3)
POTASSIUM SERPL-SCNC: 4.2 MMOL/L — SIGNIFICANT CHANGE UP (ref 3.5–5.3)
PROT SERPL-MCNC: 6.6 G/DL — SIGNIFICANT CHANGE UP (ref 6–8.3)
RBC # BLD: 4.35 M/UL — SIGNIFICANT CHANGE UP (ref 3.8–5.2)
RBC # FLD: 12.1 % — SIGNIFICANT CHANGE UP (ref 10.3–14.5)
SODIUM SERPL-SCNC: 143 MMOL/L — SIGNIFICANT CHANGE UP (ref 135–145)
WBC # BLD: 5.64 K/UL — SIGNIFICANT CHANGE UP (ref 3.8–10.5)
WBC # FLD AUTO: 5.64 K/UL — SIGNIFICANT CHANGE UP (ref 3.8–10.5)

## 2024-05-16 PROCEDURE — 85027 COMPLETE CBC AUTOMATED: CPT

## 2024-05-16 PROCEDURE — 93010 ELECTROCARDIOGRAM REPORT: CPT

## 2024-05-16 PROCEDURE — 93005 ELECTROCARDIOGRAM TRACING: CPT

## 2024-05-16 PROCEDURE — 36415 COLL VENOUS BLD VENIPUNCTURE: CPT

## 2024-05-16 PROCEDURE — G0463: CPT

## 2024-05-16 PROCEDURE — 80053 COMPREHEN METABOLIC PANEL: CPT

## 2024-05-16 NOTE — H&P PST ADULT - ASSESSMENT
72 yo female is scheduled for left middle finger metacarpophalangeal joint radial collateral ligament repair possible reconstruction with tendon graft on 5/28/2024

## 2024-05-16 NOTE — H&P PST ADULT - NSICDXFAMILYHX_GEN_ALL_CORE_FT
FAMILY HISTORY:  Father  Still living? Yes, Estimated age: Age Unknown  Family history of heart disease, Age at diagnosis: Age Unknown    Mother  Still living? No  Family history of breast cancer, Age at diagnosis: Age Unknown  Family history of dementia, Age at diagnosis: Age Unknown    Sibling  Still living? Yes, Estimated age: Age Unknown  Family hx of prostate cancer, Age at diagnosis: Age Unknown

## 2024-05-16 NOTE — H&P PST ADULT - HISTORY OF PRESENT ILLNESS
74 yo female reports fall injury 10/2023 with injury to her left middle finger.  She has tried OT for painful movement and decreased flexibility of left hand and fingers.  She is scheduled for left middle finger metacarpophalangeal joint radial collateral ligament repair possible reconstruction with tendon graft on 5/28/2024 @ [facility].

## 2024-05-16 NOTE — H&P PST ADULT - PROBLEM SELECTOR PLAN 1
left middle finger metacarpophalangeal joint radial collateral ligament repair possible reconstruction with tendon graft is planned for 5/28/2024  Diagnostic testing performed  medical clearance requested  pre op instructions were reviewed  best wishes offered

## 2024-05-16 NOTE — H&P PST ADULT - MUSCULOSKELETAL
Additional request received for lorazepam and naproxen.   details… decreased ROM due to pain/extremities exam DISPLAY PLAN FREE TEXT

## 2024-05-21 ENCOUNTER — APPOINTMENT (OUTPATIENT)
Dept: INTERNAL MEDICINE | Facility: CLINIC | Age: 74
End: 2024-05-21
Payer: MEDICARE

## 2024-05-21 VITALS
OXYGEN SATURATION: 97 % | TEMPERATURE: 97.7 F | BODY MASS INDEX: 23.75 KG/M2 | HEIGHT: 60 IN | DIASTOLIC BLOOD PRESSURE: 66 MMHG | SYSTOLIC BLOOD PRESSURE: 100 MMHG | RESPIRATION RATE: 14 BRPM | HEART RATE: 71 BPM | WEIGHT: 121 LBS

## 2024-05-21 DIAGNOSIS — Z01.818 ENCOUNTER FOR OTHER PREPROCEDURAL EXAMINATION: ICD-10-CM

## 2024-05-21 PROCEDURE — 99214 OFFICE O/P EST MOD 30 MIN: CPT

## 2024-05-21 NOTE — ASSESSMENT
[Patient Optimized for Surgery] : Patient optimized for surgery [No Further Testing Recommended] : no further testing recommended [FreeTextEntry4] : EKG NSR Presurgical labs are within acceptable limits. There are no medical contraindications to proceeding with the planned surgery/

## 2024-05-21 NOTE — HISTORY OF PRESENT ILLNESS
[No Pertinent Cardiac History] : no history of aortic stenosis, atrial fibrillation, coronary artery disease, recent myocardial infarction, or implantable device/pacemaker [No Pertinent Pulmonary History] : no history of asthma, COPD, sleep apnea, or smoking [No Adverse Anesthesia Reaction] : no adverse anesthesia reaction in self or family member [(Patient denies any chest pain, claudication, dyspnea on exertion, orthopnea, palpitations or syncope)] : Patient denies any chest pain, claudication, dyspnea on exertion, orthopnea, palpitations or syncope [Chronic Anticoagulation] : no chronic anticoagulation [Chronic Kidney Disease] : no chronic kidney disease [Diabetes] : no diabetes [FreeTextEntry1] : Left hand surgery [FreeTextEntry2] : 5/28/24 [FreeTextEntry3] : Dr. Dewey [FreeTextEntry4] : Ginna Valverde,  52, is here for presurgical evaluation. Patient is overall feeling well. Pt denies chest pain, dyspnea, lightheadedness, palpitations, fever , chills, or sweats.

## 2024-05-22 ENCOUNTER — NON-APPOINTMENT (OUTPATIENT)
Age: 74
End: 2024-05-22

## 2024-05-27 ENCOUNTER — TRANSCRIPTION ENCOUNTER (OUTPATIENT)
Age: 74
End: 2024-05-27

## 2024-05-28 ENCOUNTER — OUTPATIENT (OUTPATIENT)
Dept: OUTPATIENT SERVICES | Facility: HOSPITAL | Age: 74
LOS: 1 days | End: 2024-05-28
Payer: MEDICARE

## 2024-05-28 ENCOUNTER — APPOINTMENT (OUTPATIENT)
Dept: ORTHOPEDIC SURGERY | Facility: HOSPITAL | Age: 74
End: 2024-05-28

## 2024-05-28 ENCOUNTER — TRANSCRIPTION ENCOUNTER (OUTPATIENT)
Age: 74
End: 2024-05-28

## 2024-05-28 VITALS
RESPIRATION RATE: 12 BRPM | OXYGEN SATURATION: 97 % | HEIGHT: 60 IN | DIASTOLIC BLOOD PRESSURE: 72 MMHG | WEIGHT: 119.05 LBS | TEMPERATURE: 98 F | HEART RATE: 68 BPM | SYSTOLIC BLOOD PRESSURE: 129 MMHG

## 2024-05-28 VITALS
DIASTOLIC BLOOD PRESSURE: 74 MMHG | SYSTOLIC BLOOD PRESSURE: 110 MMHG | HEART RATE: 57 BPM | RESPIRATION RATE: 15 BRPM | OXYGEN SATURATION: 100 %

## 2024-05-28 DIAGNOSIS — S63.653A SPRAIN OF METACARPOPHALANGEAL JOINT OF LEFT MIDDLE FINGER, INITIAL ENCOUNTER: ICD-10-CM

## 2024-05-28 DIAGNOSIS — Z98.51 TUBAL LIGATION STATUS: Chronic | ICD-10-CM

## 2024-05-28 PROBLEM — S69.92XA UNSPECIFIED INJURY OF LEFT WRIST, HAND AND FINGER(S), INITIAL ENCOUNTER: Chronic | Status: ACTIVE | Noted: 2024-05-16

## 2024-05-28 PROCEDURE — 76000 FLUOROSCOPY <1 HR PHYS/QHP: CPT

## 2024-05-28 PROCEDURE — 26541 REPAIR HAND JOINT WITH GRAFT: CPT | Mod: F2

## 2024-05-28 PROCEDURE — C1713: CPT

## 2024-05-28 PROCEDURE — 26540 REPAIR HAND JOINT: CPT | Mod: AS,LT

## 2024-05-28 PROCEDURE — 26540 REPAIR HAND JOINT: CPT | Mod: F2

## 2024-05-28 DEVICE — K-WIRE MICROAIRE (SMOOTH) DOUBLE TROCAR 1.6MM X 4": Type: IMPLANTABLE DEVICE | Site: LEFT | Status: FUNCTIONAL

## 2024-05-28 DEVICE — ARTHREX INTERNALBRACE HAND/WRIST: Type: IMPLANTABLE DEVICE | Site: LEFT | Status: FUNCTIONAL

## 2024-05-28 DEVICE — K-WIRE MICROAIRE (SMOOTH) DOUBLE TROCAR 1.1MM X 4": Type: IMPLANTABLE DEVICE | Site: LEFT | Status: FUNCTIONAL

## 2024-05-28 RX ORDER — APREPITANT 80 MG/1
40 CAPSULE ORAL ONCE
Refills: 0 | Status: COMPLETED | OUTPATIENT
Start: 2024-05-28 | End: 2024-05-28

## 2024-05-28 RX ORDER — CHLORHEXIDINE GLUCONATE 213 G/1000ML
1 SOLUTION TOPICAL ONCE
Refills: 0 | Status: COMPLETED | OUTPATIENT
Start: 2024-05-28 | End: 2024-05-28

## 2024-05-28 RX ORDER — SODIUM CHLORIDE 9 MG/ML
1000 INJECTION, SOLUTION INTRAVENOUS
Refills: 0 | Status: DISCONTINUED | OUTPATIENT
Start: 2024-05-28 | End: 2024-05-28

## 2024-05-28 RX ORDER — CEFAZOLIN SODIUM 1 G
2000 VIAL (EA) INJECTION ONCE
Refills: 0 | Status: COMPLETED | OUTPATIENT
Start: 2024-05-28 | End: 2024-05-28

## 2024-05-28 RX ORDER — OXYCODONE HYDROCHLORIDE 5 MG/1
1 TABLET ORAL
Qty: 10 | Refills: 0
Start: 2024-05-28

## 2024-05-28 RX ORDER — HYDROMORPHONE HYDROCHLORIDE 2 MG/ML
0.5 INJECTION INTRAMUSCULAR; INTRAVENOUS; SUBCUTANEOUS
Refills: 0 | Status: DISCONTINUED | OUTPATIENT
Start: 2024-05-28 | End: 2024-05-28

## 2024-05-28 RX ORDER — OXYCODONE HYDROCHLORIDE 5 MG/1
5 TABLET ORAL ONCE
Refills: 0 | Status: DISCONTINUED | OUTPATIENT
Start: 2024-05-28 | End: 2024-05-28

## 2024-05-28 RX ORDER — CELECOXIB 200 MG/1
1 CAPSULE ORAL
Qty: 20 | Refills: 0
Start: 2024-05-28 | End: 2024-06-06

## 2024-05-28 RX ORDER — ATORVASTATIN CALCIUM 80 MG/1
1 TABLET, FILM COATED ORAL
Refills: 0 | DISCHARGE

## 2024-05-28 RX ORDER — CODEINE SULFATE 60 MG/1
1 TABLET ORAL
Qty: 10 | Refills: 0
Start: 2024-05-28

## 2024-05-28 RX ORDER — ONDANSETRON 8 MG/1
4 TABLET, FILM COATED ORAL ONCE
Refills: 0 | Status: DISCONTINUED | OUTPATIENT
Start: 2024-05-28 | End: 2024-05-28

## 2024-05-28 RX ADMIN — SODIUM CHLORIDE 75 MILLILITER(S): 9 INJECTION, SOLUTION INTRAVENOUS at 12:42

## 2024-05-28 RX ADMIN — CHLORHEXIDINE GLUCONATE 1 APPLICATION(S): 213 SOLUTION TOPICAL at 08:34

## 2024-05-28 RX ADMIN — APREPITANT 40 MILLIGRAM(S): 80 CAPSULE ORAL at 08:34

## 2024-05-28 NOTE — ASU PATIENT PROFILE, ADULT - FALL HARM RISK - FALLEN IN PAST
[facility] Visit Note  2/25/2020      Subjective:      Patient ID: Marlene Ibarra is a 53year old female. Chief Complaint:  Patient presents with:  Ear Pain: left ear pain when she coughs. Cough  Sinus Problem: x 1 week.       HPI:  [facility]
No

## 2024-05-28 NOTE — ASU DISCHARGE PLAN (ADULT/PEDIATRIC) - SHOWER ONLY DURATION DAY(S)
Keep dressing clean, dry, and intact. Cover dressing with cast bag or double wrapped plastic for protection when showering. Do not get splint wet

## 2024-05-28 NOTE — ASU DISCHARGE PLAN (ADULT/PEDIATRIC) - COMMENTS
call office to make and confirm follow-up appointment with surgeon. Your appointment is at the [place] office but the number above is the same.

## 2024-05-28 NOTE — ASU DISCHARGE PLAN (ADULT/PEDIATRIC) - NS MD DC FALL RISK RISK
For information on Fall & Injury Prevention, visit: [email or web address] OR  [email or web address] OR  https://www.cdc.gov/steadi/patient.html

## 2024-05-28 NOTE — ASU DISCHARGE PLAN (ADULT/PEDIATRIC) - ASU DC SPECIAL INSTRUCTIONSFT
Follow-up with Dr. Dewey on 6/3/24 at the [facility]  - Do not remove dressing/splint prior to date above  - Keep dressing clean and dry, may use cast bag/plastic bag to shower  - Elevate extremity  - Ice 30 minutes on, 30 minutes off   - RUE is Non-Weight Bearing on the splinted fingers. Wiggle other fingers regularly. Sling for comfort Follow-up with Dr. Dewey on 6/3/24 at the [facility]  - Do not remove dressing/splint prior to date above  - Keep dressing clean and dry, may use cast bag/plastic bag to shower  - Elevate extremity  - Ice 30 minutes on, 30 minutes off   - Non-Weight Bearing on the Left Upper Extremity. Left Pointer and Middle fingers splinted. Wiggle all fingers often after 24 hours, but keep dressing and splint intact. Sling for comfort

## 2024-05-28 NOTE — ASU DISCHARGE PLAN (ADULT/PEDIATRIC) - PROCEDURE
left middle finger MCP joint collateral ligament reconstruction with tendon graft Left middle finger MCP joint radial collateral ligament reconstruction with tendon autograft

## 2024-05-28 NOTE — ASU DISCHARGE PLAN (ADULT/PEDIATRIC) - ACTIVITY LEVEL
No weight bearing/Elevate extremity No exercise/No heavy lifting/No sports/gym/No weight bearing/Elevate extremity

## 2024-05-28 NOTE — BRIEF OPERATIVE NOTE - NSICDXBRIEFPROCEDURE_GEN_ALL_CORE_FT
PROCEDURES:  Repair of radial collateral ligament of metacarpophalangeal (MCP) joint 28-May-2024 12:20:28 Left Middle Finger Kb Randall

## 2024-05-28 NOTE — ASU PATIENT PROFILE, ADULT - REASON FOR ADMISSION, PROFILE
Left middle finger metacarpophalengeal joint radial collateralligament repair possible ronstructionwith

## 2024-05-28 NOTE — BRIEF OPERATIVE NOTE - NSICDXBRIEFPOSTOP_GEN_ALL_CORE_FT
POST-OP DIAGNOSIS:  Complete tear of radial collateral ligament of metacarpophalangeal (MCP) joint of finger 28-May-2024 12:22:15 left Kb Riggs

## 2024-05-28 NOTE — ASU PATIENT PROFILE, ADULT - FALL HARM RISK - UNIVERSAL INTERVENTIONS
Bed in lowest position, wheels locked, appropriate side rails in place/Call bell, personal items and telephone in reach/Instruct patient to call for assistance before getting out of bed or chair/Non-slip footwear when patient is out of bed/[place] to call system/Physically safe environment - no spills, clutter or unnecessary equipment/Purposeful Proactive Rounding/Room/bathroom lighting operational, light cord in reach

## 2024-05-28 NOTE — BRIEF OPERATIVE NOTE - NSICDXBRIEFPREOP_GEN_ALL_CORE_FT
PRE-OP DIAGNOSIS:  Complete tear of radial collateral ligament of metacarpophalangeal (MCP) joint of finger 28-May-2024 12:22:06 left Kb Riggs

## 2024-05-28 NOTE — ASU DISCHARGE PLAN (ADULT/PEDIATRIC) - CARE PROVIDER_API CALL
Alfredito Dewey)  Surgery of the Hand  [address], [address]  [place] [address]  Phone: [phone]  Fax: [phone]  Established Patient  Scheduled Appointment: 06/03/2024

## 2024-05-28 NOTE — ASU DISCHARGE PLAN (ADULT/PEDIATRIC) - NO WEIGHT BEARING DURATION
RUE is Non-Weight Bearing on the splinted fingers. Wiggle other fingers regularly. Sling for comfort Non-Weight Bearing on the Left Upper Extremity. Left Pointer and Middle fingers splinted. Wiggle all fingers often after 24 hours, but keep dressing and splint intact. Sling for comfort

## 2024-06-03 ENCOUNTER — APPOINTMENT (OUTPATIENT)
Dept: ORTHOPEDIC SURGERY | Facility: CLINIC | Age: 74
End: 2024-06-03
Payer: MEDICARE

## 2024-06-03 PROCEDURE — 99024 POSTOP FOLLOW-UP VISIT: CPT

## 2024-06-03 NOTE — END OF VISIT
[FreeTextEntry3] : This note was written by Eber Bae on 06/03/2024 acting solely as a scribe for Dr. Alfredito Dewey.   All medical record entries made by the Scribe were at my, Dr. Alfredito Dewey, direction and personally dictated by me on 06/03/2024. I have personally reviewed the chart and agree that the record accurately reflects my personal performance of the history, physical exam, assessment and plan.

## 2024-06-03 NOTE — HISTORY OF PRESENT ILLNESS
[de-identified] : 6 days postoperative. [de-identified] : 6 days status post left middle finger MCP joint radial collateral ligament reconstruction with internal brace and palmaris longus autograft.  Date of surgery: 5/28/2024.  She is doing well. She is having some pain and discomfort around the incision area.  [de-identified] : Examination of her left middle finger after the splint and dressing were removed demonstrates her incisions to be clean and dry.  There is swelling and ecchymosis.  Her middle finger is no longer radially subluxed at the MCP joint.  Her middle finger MCP joint radial collateral ligament is stable to stress testing.  She has some limitation of flexion extension of the digits.  She is neurovascularly intact distally. [de-identified] : Stable, 6 days postoperative. [de-identified] : The suture ends were cut and Steri-Strips were applied.  She was instructed on buddy taping her index and middle fingers, as well as gentle stretching and range of motion exercises. A referral for therapy was provided, to begin in 2 weeks. She will take NSAIDs as needed to manage symptoms. She will follow up in 2 weeks to assess her progress before she begins hand therapy.

## 2024-06-04 ENCOUNTER — NON-APPOINTMENT (OUTPATIENT)
Age: 74
End: 2024-06-04

## 2024-06-08 ENCOUNTER — NON-APPOINTMENT (OUTPATIENT)
Age: 74
End: 2024-06-08

## 2024-06-17 ENCOUNTER — APPOINTMENT (OUTPATIENT)
Dept: ORTHOPEDIC SURGERY | Facility: CLINIC | Age: 74
End: 2024-06-17
Payer: MEDICARE

## 2024-06-17 PROCEDURE — 99024 POSTOP FOLLOW-UP VISIT: CPT

## 2024-06-17 NOTE — HISTORY OF PRESENT ILLNESS
[de-identified] : 20 days postoperative. [de-identified] : 20 days status post left middle finger MCP joint radial collateral ligament reconstruction with internal brace and palmaris longus autograft.  Date of surgery: 5/28/2024.  She is doing well. She has been doing stretching exercises and massaging incision site as instructed, but she is still experiencing pain and stiffness. She is beginning hand therapy tomorrow. [de-identified] : Examination of her left hand demonstrates her incisions to be healing well.  There is residual swelling.   Her middle finger is no longer radially subluxed at the MCP joint.  Her middle finger MCP joint radial collateral ligament is stable to stress testing.  She has some limitation of flexion extension of the digits, to be secondary to arthritis at the IP joints.  She is neurovascularly intact distally. [de-identified] : Stable, 20 days postoperative. [de-identified] : She will begin hand therapy. She was instructed on a home exercise program consisting of mobility exercises and scar massage and desensitization.  She will continue to autumn tape the index and middle fingers.  She will follow-up in 2 weeks.

## 2024-06-17 NOTE — END OF VISIT
[FreeTextEntry3] : This note was written by Eber Bae on 06/17/2024 acting solely as a scribe for Dr. Alfredito Dewey.   All medical record entries made by the Scribe were at my, Dr. Alfredito Dewey, direction and personally dictated by me on 06/17/2024. I have personally reviewed the chart and agree that the record accurately reflects my personal performance of the history, physical exam, assessment and plan.

## 2024-06-19 ENCOUNTER — NON-APPOINTMENT (OUTPATIENT)
Age: 74
End: 2024-06-19

## 2024-06-19 NOTE — HISTORY OF PRESENT ILLNESS
[de-identified] : 34 days postoperative. [de-identified] : 34 days status post left middle finger MCP joint radial collateral ligament reconstruction with internal brace and palmaris longus autograft.  Date of surgery: 5/28/2024.  She is in hand therapy.  She is [de-identified] : Examination of her left hand demonstrates her incisions to be healing well.  There is residual swelling.   Her middle finger is no longer radially subluxed at the MCP joint.  Her middle finger MCP joint radial collateral ligament is stable to stress testing.  She has some limitation of flexion extension of the digits, to be secondary to arthritis at the IP joints.  She is neurovascularly intact distally. [de-identified] : Stable, 34 days postoperative. [de-identified] : She will continue hand therapy in addition to home exercise program.  She will avoid heavy lifting or grasping.  She will follow-up in 3 to 4 weeks.

## 2024-06-24 PROBLEM — S63.653A: Status: ACTIVE | Noted: 2023-11-15

## 2024-06-24 PROBLEM — M19.042 ARTHRITIS OF HAND, LEFT: Status: ACTIVE | Noted: 2023-10-19

## 2024-07-01 ENCOUNTER — APPOINTMENT (OUTPATIENT)
Dept: ORTHOPEDIC SURGERY | Facility: CLINIC | Age: 74
End: 2024-07-01
Payer: MEDICARE

## 2024-07-01 PROCEDURE — 99024 POSTOP FOLLOW-UP VISIT: CPT

## 2024-07-17 ENCOUNTER — NON-APPOINTMENT (OUTPATIENT)
Age: 74
End: 2024-07-17

## 2024-07-29 ENCOUNTER — APPOINTMENT (OUTPATIENT)
Dept: ORTHOPEDIC SURGERY | Facility: CLINIC | Age: 74
End: 2024-07-29
Payer: MEDICARE

## 2024-07-29 DIAGNOSIS — M19.042 PRIMARY OSTEOARTHRITIS, LEFT HAND: ICD-10-CM

## 2024-07-29 DIAGNOSIS — S63.653A SPRAIN OF METACARPOPHALANGEAL JOINT OF LEFT MIDDLE FINGER, INITIAL ENCOUNTER: ICD-10-CM

## 2024-07-29 PROCEDURE — 99024 POSTOP FOLLOW-UP VISIT: CPT

## 2024-07-29 NOTE — HISTORY OF PRESENT ILLNESS
[de-identified] : 62 days postoperative. [de-identified] : 62 days status post left middle finger MCP joint radial collateral ligament reconstruction with internal brace and palmaris longus autograft.  Date of surgery: 5/28/2024.  She is in hand therapy.  She is overall doing well today. She returns today reporting swelling in her finger that occurs in the morning. She also reports that her stitches are causing irritation, and she has been trying to use exfoliator and cream to remove them. She denies fever, chills, numbness or tingling. [de-identified] : Examination of her left hand demonstrates her incisions to be healing well.  There is decreased swelling.   Her middle finger is no longer radially subluxed at the MCP joint.  Her middle finger MCP joint radial collateral ligament is stable to stress testing.  She has improved flexion and extension of the digits with soft endpoints.  However, she still lacks terminal flexion and extension.  She remains neurovascularly intact distally. [de-identified] : Stable, 62 days postoperative.  She has residual symptoms secondary to her arthritis at the MCP and PIP joints. [de-identified] : She will continue hand therapy in addition to a home exercise program. She inquired about having an updated prescription for hand therapy. As she still has 5 sessions left, I told her to finish out her current prescription and we can talk after based on her symptoms. She can begin strengthening.  She will follow-up in 6 weeks.

## 2024-07-29 NOTE — ADDENDUM
[FreeTextEntry1] :  I, Vance Rico, acted solely as a scribe for Dr. Dewey on this date on 07/29/2024. No

## 2024-07-29 NOTE — END OF VISIT
[FreeTextEntry3] : This note was written by Vance Rico on 07/29/2024 acting solely as a scribe for Dr. Alfredito Dewey.   All medical record entries made by the Scribe were at my, Dr. Alfredito Dewey, direction and personally dictated by me on 07/29/2024. I have personally reviewed the chart and agree that the record accurately reflects my personal performance of the history, physical exam, assessment and plan.

## 2024-07-29 NOTE — HISTORY OF PRESENT ILLNESS
[de-identified] : 62 days postoperative. [de-identified] : 62 days status post left middle finger MCP joint radial collateral ligament reconstruction with internal brace and palmaris longus autograft.  Date of surgery: 5/28/2024.  She is in hand therapy.  She is overall doing well today. She returns today reporting swelling in her finger that occurs in the morning. She also reports that her stitches are causing irritation, and she has been trying to use exfoliator and cream to remove them. She denies fever, chills, numbness or tingling. [de-identified] : Examination of her left hand demonstrates her incisions to be healing well.  There is decreased swelling.   Her middle finger is no longer radially subluxed at the MCP joint.  Her middle finger MCP joint radial collateral ligament is stable to stress testing.  She has improved flexion and extension of the digits with soft endpoints.  However, she still lacks terminal flexion and extension.  She remains neurovascularly intact distally. [de-identified] : Stable, 62 days postoperative.  She has residual symptoms secondary to her arthritis at the MCP and PIP joints. [de-identified] : She will continue hand therapy in addition to a home exercise program. She inquired about having an updated prescription for hand therapy. As she still has 5 sessions left, I told her to finish out her current prescription and we can talk after based on her symptoms. She can begin strengthening.  She will follow-up in 6 weeks.

## 2024-07-29 NOTE — ADDENDUM
[FreeTextEntry1] :  I, Vance Rico, acted solely as a scribe for Dr. Dewey on this date on 07/29/2024.

## 2024-08-07 ENCOUNTER — RX RENEWAL (OUTPATIENT)
Age: 74
End: 2024-08-07

## 2024-08-29 ENCOUNTER — NON-APPOINTMENT (OUTPATIENT)
Age: 74
End: 2024-08-29

## 2024-08-29 NOTE — HISTORY OF PRESENT ILLNESS
[de-identified] : 104 days postoperative. [de-identified] : 104 days status post left middle finger MCP joint radial collateral ligament reconstruction with internal brace and palmaris longus autograft.  Date of surgery: 5/28/2024.  She is in hand therapy.  She is  [de-identified] : Examination of her left hand demonstrates her incisions to be healing well.  There is decreased swelling.   Her middle finger is no longer radially subluxed at the MCP joint.  Her middle finger MCP joint radial collateral ligament is stable to stress testing.  She has improved flexion and extension of the digits with soft endpoints.  However, she still lacks terminal flexion and extension.  She remains neurovascularly intact distally. [de-identified] : Stable, 104 days postoperative. [de-identified] : She was instructed on

## 2024-09-09 ENCOUNTER — APPOINTMENT (OUTPATIENT)
Dept: ORTHOPEDIC SURGERY | Facility: CLINIC | Age: 74
End: 2024-09-09
Payer: MEDICARE

## 2024-09-09 DIAGNOSIS — S63.653A SPRAIN OF METACARPOPHALANGEAL JOINT OF LEFT MIDDLE FINGER, INITIAL ENCOUNTER: ICD-10-CM

## 2024-09-09 DIAGNOSIS — M19.042 PRIMARY OSTEOARTHRITIS, LEFT HAND: ICD-10-CM

## 2024-09-09 PROCEDURE — 99214 OFFICE O/P EST MOD 30 MIN: CPT

## 2024-09-09 NOTE — ADDENDUM
[FreeTextEntry1] :  I, Vance Rico, acted solely as a scribe for Dr. Dewey on this date on 09/09/2024.

## 2024-09-09 NOTE — ADDENDUM
[FreeTextEntry1] :  I, Vance Rioc, acted solely as a scribe for Dr. Dewey on this date on 09/09/2024.

## 2024-09-09 NOTE — END OF VISIT
[FreeTextEntry3] : This note was written by Vance Rico on 09/09/2024 acting solely as a scribe for Dr. Alfredito Dewey.   All medical record entries made by the Scribe were at my, Dr. Alfredito Dewey, direction and personally dictated by me on 09/09/2024. I have personally reviewed the chart and agree that the record accurately reflects my personal performance of the history, physical exam, assessment and plan.

## 2024-09-09 NOTE — HISTORY OF PRESENT ILLNESS
[de-identified] : 104 days postoperative. [de-identified] : 104 days status post left middle finger MCP joint radial collateral ligament reconstruction with internal brace and palmaris longus autograft.  Date of surgery: 5/28/2024.  She is in hand therapy.  She is doing well today. She reports tingling with overuse and inability to fully straighten her left middle finger. She rates her pain as a 5-6/10 while exercising.  [de-identified] : Examination of her left hand demonstrates her incisions to be well-healed.  There is decreased swelling.   Her middle finger is no longer radially subluxed at the MCP joint.  Her middle finger MCP joint radial collateral ligament is stable to stress testing.  She has improved flexion and extension of the digits with soft, she still lacks terminal flexion and extension.  She remains neurovascularly intact distally. [de-identified] : Stable, 104 days postoperative, with some residual symptoms.. [de-identified] : She was instructed to continue hand therapy. I did explain to her that finger stiffness is a common occurrence after surgery. I reminded her that she has made significant progress in strength and mobility since her surgical procedure. An updated prescription for hand therapy was provided today. She will follow-up in 8 weeks. If she experiences any changes, problems, or needs for an updated prescription in the interim, she will call my office.

## 2024-09-09 NOTE — HISTORY OF PRESENT ILLNESS
[de-identified] : 104 days postoperative. [de-identified] : 104 days status post left middle finger MCP joint radial collateral ligament reconstruction with internal brace and palmaris longus autograft.  Date of surgery: 5/28/2024.  She is in hand therapy.  She is doing well today. She reports tingling with overuse and inability to fully straighten her left middle finger. She rates her pain as a 5-6/10 while exercising.  [de-identified] : Examination of her left hand demonstrates her incisions to be well-healed.  There is decreased swelling.   Her middle finger is no longer radially subluxed at the MCP joint.  Her middle finger MCP joint radial collateral ligament is stable to stress testing.  She has improved flexion and extension of the digits with soft, she still lacks terminal flexion and extension.  She remains neurovascularly intact distally. [de-identified] : Stable, 104 days postoperative, with some residual symptoms.. [de-identified] : She was instructed to continue hand therapy. I did explain to her that finger stiffness is a common occurrence after surgery. I reminded her that she has made significant progress in strength and mobility since her surgical procedure. An updated prescription for hand therapy was provided today. She will follow-up in 8 weeks. If she experiences any changes, problems, or needs for an updated prescription in the interim, she will call my office.

## 2024-09-23 ENCOUNTER — APPOINTMENT (OUTPATIENT)
Dept: GASTROENTEROLOGY | Facility: CLINIC | Age: 74
End: 2024-09-23
Payer: MEDICARE

## 2024-09-23 VITALS
SYSTOLIC BLOOD PRESSURE: 99 MMHG | DIASTOLIC BLOOD PRESSURE: 63 MMHG | WEIGHT: 117 LBS | HEIGHT: 60 IN | BODY MASS INDEX: 22.97 KG/M2 | OXYGEN SATURATION: 97 % | HEART RATE: 67 BPM

## 2024-09-23 DIAGNOSIS — Z12.11 ENCOUNTER FOR SCREENING FOR MALIGNANT NEOPLASM OF COLON: ICD-10-CM

## 2024-09-23 DIAGNOSIS — R19.5 OTHER FECAL ABNORMALITIES: ICD-10-CM

## 2024-09-23 PROCEDURE — 99203 OFFICE O/P NEW LOW 30 MIN: CPT

## 2024-09-23 RX ORDER — SODIUM SULFATE, MAGNESIUM SULFATE, AND POTASSIUM CHLORIDE 17.75; 2.7; 2.25 G/1; G/1; G/1
1479-225-188 TABLET ORAL
Qty: 1 | Refills: 0 | Status: ACTIVE | COMMUNITY
Start: 2024-09-23 | End: 1900-01-01

## 2024-09-23 NOTE — PHYSICAL EXAM
[Hearing Threshold Finger Rub Not [place]] : hearing was normal [Normal Appearance] : the appearance of the neck was normal [Normal] : heart rate was normal and rhythm regular, normal S1 and S2, no murmurs [None] : no edema [Bowel Sounds] : normal bowel sounds [Abdomen Tenderness] : non-tender [No Masses] : no abdominal mass palpated [Abdomen Soft] : soft [Oriented To Time, Place, And Person] : oriented to person, place, and time [Normal Lips/Gums] : the lips and gums were normal [Oropharynx] : the oropharynx was normal [No Neck Mass] : no neck mass was observed

## 2024-09-23 NOTE — PHYSICAL EXAM
[Hearing Threshold Finger Rub Not [place]] : hearing was normal [Normal Appearance] : the appearance of the neck was normal [Normal] : heart rate was normal and rhythm regular, normal S1 and S2, no murmurs [None] : no edema [Bowel Sounds] : normal bowel sounds [Abdomen Tenderness] : non-tender [No Masses] : no abdominal mass palpated [Abdomen Soft] : soft [Oriented To Time, Place, And Person] : oriented to person, place, and time [Normal Lips/Gums] : the lips and gums were normal [Oropharynx] : the oropharynx was normal [No Neck Mass] : no neck mass was observed

## 2024-09-25 NOTE — ASSESSMENT
[FreeTextEntry1] : Pleasant 74-year-old woman with a FH of breast cancer (mother) and a PMH of positive Cologuard (6/13/23) and HLD presents to establish care for CRC screening. No GI symptoms. No FH of GI disease or cancer. Recommend that the pt schedule a diagnostic colonoscopy. Pt is interested in Sutab bowel prep. Pt is interested in having procedure at [facility] due to location.   The patient will proceed with a diagnostic colonoscopy. I explained to the patient the risks, alternatives and benefits to a diagnostic colonoscopy. Risk including but not limited to bleeding, perforation, infection adverse medication reaction. Questions were answered. agreeable to proceed with the planned procedures.   The patient will hold NSAIDs and vitamins/supplements for 5 days prior. Otherwise continue medications as prescribed. The patient is not on diabetes medications or anticoagulation.    Patient seen and examined, overseeing documentation by Alejandro JOSEPH Will proceed with a diagnostic colonoscopy. Medications as usual. Reviewed and reconciled medications, allergies, PMHx, PSHx, SocHx, FMHx. Reviewed imaging, blood work, diagnostic testing, discussed with patient. Further recommendations pending results of above work-up and evaluation.  All questions answered Call with any questions or concerns Time spent before and after visit reviewing patient's chart

## 2024-09-25 NOTE — CONSULT LETTER
[Dear  ___] : Dear  [unfilled], [Consult Letter:] : I had the pleasure of evaluating your patient, [unfilled]. [Please see my note below.] : Please see my note below. [Consult Closing:] : Thank you very much for allowing me to participate in the care of this patient.  If you have any questions, please do not hesitate to contact me. [Sincerely,] : Sincerely, [FreeTextEntry3] : Dr. Anna Shelby

## 2024-09-25 NOTE — HISTORY OF PRESENT ILLNESS
[FreeTextEntry1] : Pleasant 74-year-old woman with a FH of breast cancer (mother) and a PMH of positive Cologuard (6/13/23) and HLD presents to establish care for CRC screening. No GI symptoms. No FH of GI disease or cancer.

## 2024-09-25 NOTE — ASSESSMENT
[FreeTextEntry1] : Pleasant 74-year-old woman with a FH of breast cancer (mother) and a PMH of positive Cologuard (6/13/23) and HLD presents to establish care for CRC screening. No GI symptoms. No FH of GI disease or cancer. Recommend that the pt schedule a diagnostic colonoscopy. Pt is interested in Sutab bowel prep. Pt is interested in having procedure at [facility] due to location.   The patient will proceed with a diagnostic colonoscopy. I explained to the patient the risks, alternatives and benefits to a diagnostic colonoscopy. Risk including but not limited to bleeding, perforation, infection adverse medication reaction. Questions were answered. agreeable to proceed with the planned procedures.   The patient will hold NSAIDs and vitamins/supplements for 5 days prior. Otherwise continue medications as prescribed. The patient is not on diabetes medications or anticoagulation.    Patient seen and examined, overseeing documentation by Alejandro JOSEPH Will proceed with a diagnostic colonoscopy. Medications as usual. Reviewed and reconciled medications, allergies, PMHx, PSHx, SocHx, FMHx. Reviewed imaging, blood work, diagnostic testing, discussed with patient. Further recommendations pending results of above work-up and evaluation.  All questions answered Call with any questions or concerns Time spent before and after visit reviewing patient's chart

## 2024-10-10 ENCOUNTER — RESULT REVIEW (OUTPATIENT)
Age: 74
End: 2024-10-10

## 2024-10-10 ENCOUNTER — APPOINTMENT (OUTPATIENT)
Dept: GASTROENTEROLOGY | Facility: HOSPITAL | Age: 74
End: 2024-10-10

## 2024-10-10 ENCOUNTER — OUTPATIENT (OUTPATIENT)
Dept: OUTPATIENT SERVICES | Facility: HOSPITAL | Age: 74
LOS: 1 days | End: 2024-10-10
Payer: MEDICARE

## 2024-10-10 DIAGNOSIS — Z12.11 ENCOUNTER FOR SCREENING FOR MALIGNANT NEOPLASM OF COLON: ICD-10-CM

## 2024-10-10 DIAGNOSIS — Z98.51 TUBAL LIGATION STATUS: Chronic | ICD-10-CM

## 2024-10-10 PROCEDURE — 43251 EGD REMOVE LESION SNARE: CPT

## 2024-10-10 PROCEDURE — 88305 TISSUE EXAM BY PATHOLOGIST: CPT | Mod: 26

## 2024-10-10 DEVICE — HEMOSPRAY HEMOSTAT ENDO 7F: Type: IMPLANTABLE DEVICE | Status: FUNCTIONAL

## 2024-10-10 DEVICE — CLIP RESOLUTION 360 235CM: Type: IMPLANTABLE DEVICE | Status: FUNCTIONAL

## 2024-10-14 ENCOUNTER — TRANSCRIPTION ENCOUNTER (OUTPATIENT)
Age: 74
End: 2024-10-14

## 2024-10-14 LAB — SURGICAL PATHOLOGY STUDY: SIGNIFICANT CHANGE UP

## 2024-10-24 ENCOUNTER — NON-APPOINTMENT (OUTPATIENT)
Age: 74
End: 2024-10-24

## 2024-11-04 ENCOUNTER — APPOINTMENT (OUTPATIENT)
Dept: ORTHOPEDIC SURGERY | Facility: CLINIC | Age: 74
End: 2024-11-04
Payer: MEDICARE

## 2024-11-04 DIAGNOSIS — M19.042 PRIMARY OSTEOARTHRITIS, LEFT HAND: ICD-10-CM

## 2024-11-04 DIAGNOSIS — S63.653A SPRAIN OF METACARPOPHALANGEAL JOINT OF LEFT MIDDLE FINGER, INITIAL ENCOUNTER: ICD-10-CM

## 2024-11-04 PROCEDURE — 99213 OFFICE O/P EST LOW 20 MIN: CPT

## 2024-11-20 PROCEDURE — 45385 COLONOSCOPY W/LESION REMOVAL: CPT | Mod: PT

## 2024-11-20 PROCEDURE — 88305 TISSUE EXAM BY PATHOLOGIST: CPT

## 2025-03-14 ENCOUNTER — NON-APPOINTMENT (OUTPATIENT)
Age: 75
End: 2025-03-14

## 2025-03-14 DIAGNOSIS — Z00.00 ENCOUNTER FOR GENERAL ADULT MEDICAL EXAMINATION W/OUT ABNORMAL FINDINGS: ICD-10-CM

## 2025-03-14 DIAGNOSIS — E78.2 MIXED HYPERLIPIDEMIA: ICD-10-CM

## 2025-04-14 ENCOUNTER — NON-APPOINTMENT (OUTPATIENT)
Age: 75
End: 2025-04-14

## 2025-04-14 ENCOUNTER — APPOINTMENT (OUTPATIENT)
Dept: INTERNAL MEDICINE | Facility: CLINIC | Age: 75
End: 2025-04-14
Payer: MEDICARE

## 2025-04-14 VITALS
RESPIRATION RATE: 12 BRPM | TEMPERATURE: 98.3 F | BODY MASS INDEX: 22.97 KG/M2 | HEART RATE: 61 BPM | WEIGHT: 117 LBS | SYSTOLIC BLOOD PRESSURE: 110 MMHG | HEIGHT: 60 IN | DIASTOLIC BLOOD PRESSURE: 70 MMHG | OXYGEN SATURATION: 99 %

## 2025-04-14 DIAGNOSIS — Z01.818 ENCOUNTER FOR OTHER PREPROCEDURAL EXAMINATION: ICD-10-CM

## 2025-04-14 DIAGNOSIS — H26.9 UNSPECIFIED CATARACT: ICD-10-CM

## 2025-04-14 PROCEDURE — 93000 ELECTROCARDIOGRAM COMPLETE: CPT

## 2025-04-14 PROCEDURE — 99214 OFFICE O/P EST MOD 30 MIN: CPT

## 2025-05-01 ENCOUNTER — APPOINTMENT (OUTPATIENT)
Dept: INTERNAL MEDICINE | Facility: CLINIC | Age: 75
End: 2025-05-01
Payer: MEDICARE

## 2025-05-01 ENCOUNTER — LABORATORY RESULT (OUTPATIENT)
Age: 75
End: 2025-05-01

## 2025-05-01 VITALS
HEART RATE: 67 BPM | TEMPERATURE: 97.4 F | HEIGHT: 60 IN | DIASTOLIC BLOOD PRESSURE: 78 MMHG | BODY MASS INDEX: 23.75 KG/M2 | WEIGHT: 121 LBS | SYSTOLIC BLOOD PRESSURE: 118 MMHG | RESPIRATION RATE: 14 BRPM | OXYGEN SATURATION: 98 %

## 2025-05-01 DIAGNOSIS — E78.2 MIXED HYPERLIPIDEMIA: ICD-10-CM

## 2025-05-01 DIAGNOSIS — Z00.00 ENCOUNTER FOR GENERAL ADULT MEDICAL EXAMINATION W/OUT ABNORMAL FINDINGS: ICD-10-CM

## 2025-05-01 PROCEDURE — 93000 ELECTROCARDIOGRAM COMPLETE: CPT

## 2025-05-01 PROCEDURE — G0439: CPT

## 2025-05-08 LAB
25(OH)D3 SERPL-MCNC: 28.6 NG/ML
ALBUMIN SERPL ELPH-MCNC: 4.2 G/DL
ALP BLD-CCNC: 73 U/L
ALT SERPL-CCNC: 22 U/L
ANION GAP SERPL CALC-SCNC: 9 MMOL/L
APPEARANCE: CLEAR
AST SERPL-CCNC: 22 U/L
BILIRUB SERPL-MCNC: 0.6 MG/DL
BILIRUBIN URINE: NEGATIVE
BLOOD URINE: ABNORMAL
BUN SERPL-MCNC: 17 MG/DL
CALCIUM SERPL-MCNC: 9.5 MG/DL
CHLORIDE SERPL-SCNC: 107 MMOL/L
CHOLEST SERPL-MCNC: 175 MG/DL
CO2 SERPL-SCNC: 27 MMOL/L
COLOR: YELLOW
CREAT SERPL-MCNC: 0.84 MG/DL
EGFRCR SERPLBLD CKD-EPI 2021: 73 ML/MIN/1.73M2
ESTIMATED AVERAGE GLUCOSE: 105 MG/DL
FOLATE SERPL-MCNC: 12.4 NG/ML
GLUCOSE QUALITATIVE U: NEGATIVE MG/DL
GLUCOSE SERPL-MCNC: 91 MG/DL
HBA1C MFR BLD HPLC: 5.3 %
HCT VFR BLD CALC: 43 %
HDLC SERPL-MCNC: 66 MG/DL
HGB BLD-MCNC: 13.7 G/DL
KETONES URINE: NEGATIVE MG/DL
LDLC SERPL-MCNC: 99 MG/DL
LEUKOCYTE ESTERASE URINE: ABNORMAL
MCHC RBC-ENTMCNC: 31.1 PG
MCHC RBC-ENTMCNC: 31.9 G/DL
MCV RBC AUTO: 97.5 FL
NITRITE URINE: NEGATIVE
NONHDLC SERPL-MCNC: 110 MG/DL
PH URINE: 5.5
PLATELET # BLD AUTO: 184 K/UL
POTASSIUM SERPL-SCNC: 5.6 MMOL/L
PROT SERPL-MCNC: 6.5 G/DL
PROTEIN URINE: NEGATIVE MG/DL
RBC # BLD: 4.41 M/UL
RBC # FLD: 13.2 %
SODIUM SERPL-SCNC: 143 MMOL/L
SPECIFIC GRAVITY URINE: 1.02
TRIGL SERPL-MCNC: 53 MG/DL
TSH SERPL-ACNC: 1.91 UIU/ML
UROBILINOGEN URINE: 0.2 MG/DL
VIT B12 SERPL-MCNC: 436 PG/ML
WBC # FLD AUTO: 7.16 K/UL

## 2025-05-27 NOTE — H&P PST ADULT - WEIGHT IN LBS
Plan: No refills needed at this time was refilled by PCP Patient will call when one is needed. Render In Strict Bullet Format?: No Detail Level: Zone Continue Regimen: Clobetasol 0.05% cream twice a day to plaques as need flaring Continue Regimen: 5FU as needed. No refills needed at this time. (Originally prescribed by Camelia in [place]) 115.9

## (undated) DEVICE — MASK O2 NON REBREATH 3IN1 ADULT

## (undated) DEVICE — TUBING IV SET GRAVITY 3Y 100" MACRO

## (undated) DEVICE — DRAPE C ARM MINI

## (undated) DEVICE — ELCTR GROUNDING PAD ADULT COVIDIEN

## (undated) DEVICE — NDL INJ SCLERO INTERJECT 23G

## (undated) DEVICE — GLV 7.5 PROTEXIS (BLUE)

## (undated) DEVICE — SPECIMEN CONTAINER 100ML

## (undated) DEVICE — SUT VICRYL 3-0 27" RB-1 UNDYED

## (undated) DEVICE — SNARE POLYP SENS 27MM 240CM

## (undated) DEVICE — Device

## (undated) DEVICE — SPECIMEN CONTAINER PET

## (undated) DEVICE — CANISTER SUCTION LID GUARD 3000CC

## (undated) DEVICE — SYR LUER SLIP TIP 50CC

## (undated) DEVICE — TOURNIQUET ESMARK 4"

## (undated) DEVICE — TUBING SUCTION CONN 6FT STERILE

## (undated) DEVICE — CATH IV SAFE BC 22G X 1" (BLUE)

## (undated) DEVICE — CATH ELECHMSTAT  INJ 7FR 210CM

## (undated) DEVICE — SUCTION YANKAUER TAPERED BULBOUS NO VENT

## (undated) DEVICE — RETRIEVER ROTH NET PLATINUM-UNIVERSAL

## (undated) DEVICE — SOL IRR NS 0.9% 250ML

## (undated) DEVICE — ENDOCUFF VISION SZ 3 SM PRPL

## (undated) DEVICE — MASK LRG MED AND HIGH O2 CONC M TO M 10FT

## (undated) DEVICE — STERIS DEFENDO 3-PIECE KIT (AIR/WATER, SUCTION & BIOPSY VALVES)

## (undated) DEVICE — CLAMP BX HOT RAD JAW 3

## (undated) DEVICE — TRAP SPECIMEN SPUTUM 40CC

## (undated) DEVICE — SNARE LRG

## (undated) DEVICE — VENODYNE/SCD SLEEVE CALF MEDIUM

## (undated) DEVICE — BRUSH COLONOSCOPY CYTOLOGY

## (undated) DEVICE — PACK UPPER EXTREMITY

## (undated) DEVICE — SYR LUER SLIP TIP 30CC

## (undated) DEVICE — FORCEP RADIAL JAW 4 JUMBO 2.8MM 3.2MM 240CM ORANGE DISP

## (undated) DEVICE — NDL HYPO REGULAR BEVEL 25G X 1.5" (BLUE)

## (undated) DEVICE — SOL IRR POUR NS 0.9% 500ML

## (undated) DEVICE — POSITIONER STRAP ARMBOARD VELCRO TS-30

## (undated) DEVICE — POSITIONER FOAM HEAD CRADLE (PINK)

## (undated) DEVICE — GLV 7 PROTEXIS (WHITE)

## (undated) DEVICE — FORCEP RADIAL JAW 4 W NDL 2.4MM 2.8MM 240CM ORANGE DISP

## (undated) DEVICE — POLY TRAP ETRAP

## (undated) DEVICE — SOL IRR POUR H2O 500ML

## (undated) DEVICE — TOURNIQUET CUFF 18" DUAL PORT SINGLE BLADDER W PLC  (BLACK)

## (undated) DEVICE — SUT HEWSON RETRIEVER

## (undated) DEVICE — CATH IV SAFE BC 20G X 1.16" (PINK)

## (undated) DEVICE — SUT VICRYL 4-0 27" RB-1 UNDYED

## (undated) DEVICE — SET IV PUMP BLOOD 1VALVE 180FILTER NON-DEHP

## (undated) DEVICE — TUBE RECTAL 24FR

## (undated) DEVICE — CATH ELCTR GLIDE PRB 7FR

## (undated) DEVICE — SNARE CAPTIVATOR II RND COLD 10MM

## (undated) DEVICE — CANISTER SUCTION 1200CC 10/SL

## (undated) DEVICE — MARKER ENDO SPOT EX

## (undated) DEVICE — SENSOR O2 FINGER XL ADULT 24/BX 6BX/CA

## (undated) DEVICE — DRSG WEBRIL 3"

## (undated) DEVICE — TUBE O2 SUPL CRUSH RESIS CONN SOUTHSIDE ONLY

## (undated) DEVICE — TRAP QUICK CATCH  SINGL CHAMBER

## (undated) DEVICE — VALVE BIOPSY

## (undated) DEVICE — SYR IV POSIFLUSH NS 3ML 30/TY

## (undated) DEVICE — FORMALIN CUPS 10% BUFFERED

## (undated) DEVICE — DRAPE 3/4 SHEET 52X76"

## (undated) DEVICE — SUT MONOCRYL 5-0 18" P-3 UNDYED

## (undated) DEVICE — PACK IV START WITH CHG

## (undated) DEVICE — ENDOCUFF VISION SZ 2 LG GRN

## (undated) DEVICE — TUBING IV SET SECONDARY 34"

## (undated) DEVICE — DRAPE TOWEL BLUE 17" X 24"

## (undated) DEVICE — TUBING CANNULA SALTER LABS NASAL ADULT 7FT

## (undated) DEVICE — WARMING BLANKET LOWER ADULT